# Patient Record
Sex: MALE | Race: OTHER | Employment: UNEMPLOYED | URBAN - METROPOLITAN AREA
[De-identification: names, ages, dates, MRNs, and addresses within clinical notes are randomized per-mention and may not be internally consistent; named-entity substitution may affect disease eponyms.]

---

## 2017-02-01 ENCOUNTER — HOSPITAL ENCOUNTER (OUTPATIENT)
Dept: LAB | Age: 42
Discharge: HOME | End: 2017-02-01

## 2017-02-01 LAB
ALBUMIN SERPL BCP-MCNC: 4.4 G/DL (ref 3.5–5)
ALBUMIN/GLOB SERPL: 1.4 {RATIO} (ref 1.1–2.2)
ALP SERPL-CCNC: 98 U/L (ref 45–117)
ALT SERPL-CCNC: 46 U/L (ref 12–78)
ANION GAP BLD CALC-SCNC: 8 MMOL/L (ref 5–15)
AST SERPL W P-5'-P-CCNC: 24 U/L (ref 15–37)
BILIRUB SERPL-MCNC: 0.4 MG/DL (ref 0.2–1)
BUN SERPL-MCNC: 15 MG/DL (ref 6–20)
BUN/CREAT SERPL: 16 (ref 12–20)
CALCIUM SERPL-MCNC: 8.8 MG/DL (ref 8.5–10.1)
CHLORIDE SERPL-SCNC: 102 MMOL/L (ref 97–108)
CHOLEST SERPL-MCNC: 171 MG/DL
CO2 SERPL-SCNC: 26 MMOL/L (ref 21–32)
CREAT SERPL-MCNC: 0.94 MG/DL (ref 0.7–1.3)
GLOBULIN SER CALC-MCNC: 3.1 G/DL (ref 2–4)
GLUCOSE SERPL-MCNC: 90 MG/DL (ref 65–100)
HDLC SERPL-MCNC: 27 MG/DL
HDLC SERPL: 6.3 {RATIO} (ref 0–5)
LDLC SERPL CALC-MCNC: ABNORMAL MG/DL (ref 0–100)
LDLC SERPL DIRECT ASSAY-MCNC: 85 MG/DL (ref 0–100)
LIPID PROFILE,FLP: ABNORMAL
POTASSIUM SERPL-SCNC: 3.9 MMOL/L (ref 3.5–5.1)
PROT SERPL-MCNC: 7.5 G/DL (ref 6.4–8.2)
SODIUM SERPL-SCNC: 136 MMOL/L (ref 136–145)
TRIGL SERPL-MCNC: 518 MG/DL (ref ?–150)
VLDLC SERPL CALC-MCNC: ABNORMAL MG/DL

## 2017-02-01 PROCEDURE — 83721 ASSAY OF BLOOD LIPOPROTEIN: CPT | Performed by: INTERNAL MEDICINE

## 2017-02-01 PROCEDURE — 80053 COMPREHEN METABOLIC PANEL: CPT | Performed by: INTERNAL MEDICINE

## 2017-02-01 PROCEDURE — 80061 LIPID PANEL: CPT | Performed by: INTERNAL MEDICINE

## 2017-07-10 ENCOUNTER — HOSPITAL ENCOUNTER (OUTPATIENT)
Dept: LAB | Age: 42
Discharge: HOME | End: 2017-07-10

## 2017-07-10 PROCEDURE — 84460 ALANINE AMINO (ALT) (SGPT): CPT | Performed by: INTERNAL MEDICINE

## 2017-07-10 PROCEDURE — 83721 ASSAY OF BLOOD LIPOPROTEIN: CPT | Performed by: INTERNAL MEDICINE

## 2017-07-10 PROCEDURE — 80061 LIPID PANEL: CPT | Performed by: INTERNAL MEDICINE

## 2017-07-11 LAB
ALT SERPL-CCNC: 53 U/L (ref 12–78)
CHOLEST SERPL-MCNC: 172 MG/DL
HDLC SERPL-MCNC: 27 MG/DL
HDLC SERPL: 6.4 {RATIO} (ref 0–5)
LDLC SERPL CALC-MCNC: ABNORMAL MG/DL (ref 0–100)
LDLC SERPL DIRECT ASSAY-MCNC: 79 MG/DL (ref 0–100)
LIPID PROFILE,FLP: ABNORMAL
TRIGL SERPL-MCNC: 511 MG/DL (ref ?–150)
VLDLC SERPL CALC-MCNC: ABNORMAL MG/DL

## 2017-08-21 ENCOUNTER — HOSPITAL ENCOUNTER (OUTPATIENT)
Dept: LAB | Age: 42
Discharge: HOME | End: 2017-08-21

## 2017-08-21 PROCEDURE — 80061 LIPID PANEL: CPT | Performed by: INTERNAL MEDICINE

## 2017-08-21 PROCEDURE — 84460 ALANINE AMINO (ALT) (SGPT): CPT | Performed by: INTERNAL MEDICINE

## 2017-08-22 LAB
ALT SERPL-CCNC: 53 U/L (ref 12–78)
CHOLEST SERPL-MCNC: 182 MG/DL
HDLC SERPL-MCNC: 37 MG/DL
HDLC SERPL: 4.9 {RATIO} (ref 0–5)
LDLC SERPL CALC-MCNC: 91.4 MG/DL (ref 0–100)
LIPID PROFILE,FLP: ABNORMAL
TRIGL SERPL-MCNC: 268 MG/DL (ref ?–150)
VLDLC SERPL CALC-MCNC: 53.6 MG/DL

## 2018-03-20 ENCOUNTER — HOSPITAL ENCOUNTER (OUTPATIENT)
Dept: NON INVASIVE DIAGNOSTICS | Age: 43
Discharge: HOME | End: 2018-03-20
Payer: SUBSIDIZED

## 2018-03-20 DIAGNOSIS — R94.31 ABNORMAL ELECTROCARDIOGRAM: ICD-10-CM

## 2018-03-20 PROCEDURE — 93306 TTE W/DOPPLER COMPLETE: CPT

## 2018-08-13 ENCOUNTER — HOSPITAL ENCOUNTER (OUTPATIENT)
Dept: LAB | Age: 43
Discharge: HOME | End: 2018-08-13

## 2018-08-13 PROCEDURE — 80061 LIPID PANEL: CPT | Performed by: NURSE PRACTITIONER

## 2018-08-14 LAB
CHOLEST SERPL-MCNC: 156 MG/DL
HDLC SERPL-MCNC: 28 MG/DL
HDLC SERPL: 5.6 {RATIO} (ref 0–5)
LDLC SERPL CALC-MCNC: 63 MG/DL (ref 0–100)
LIPID PROFILE,FLP: ABNORMAL
TRIGL SERPL-MCNC: 325 MG/DL (ref ?–150)
VLDLC SERPL CALC-MCNC: 65 MG/DL

## 2019-06-04 ENCOUNTER — HOSPITAL ENCOUNTER (OUTPATIENT)
Dept: LAB | Age: 44
Discharge: HOME | End: 2019-06-04

## 2019-06-04 LAB
ALBUMIN SERPL-MCNC: 4.4 G/DL (ref 3.5–5)
ALBUMIN/GLOB SERPL: 1.2 {RATIO} (ref 1.1–2.2)
ALP SERPL-CCNC: 99 U/L (ref 45–117)
ALT SERPL-CCNC: 60 U/L (ref 12–78)
ANION GAP SERPL CALC-SCNC: 8 MMOL/L (ref 5–15)
AST SERPL-CCNC: 39 U/L (ref 15–37)
BILIRUB SERPL-MCNC: 0.5 MG/DL (ref 0.2–1)
BUN SERPL-MCNC: 14 MG/DL (ref 6–20)
BUN/CREAT SERPL: 15 (ref 12–20)
CALCIUM SERPL-MCNC: 9.5 MG/DL (ref 8.5–10.1)
CHLORIDE SERPL-SCNC: 105 MMOL/L (ref 97–108)
CO2 SERPL-SCNC: 26 MMOL/L (ref 21–32)
CREAT SERPL-MCNC: 0.92 MG/DL (ref 0.7–1.3)
GLOBULIN SER CALC-MCNC: 3.7 G/DL (ref 2–4)
GLUCOSE SERPL-MCNC: 102 MG/DL (ref 65–100)
POTASSIUM SERPL-SCNC: 4.4 MMOL/L (ref 3.5–5.1)
PROT SERPL-MCNC: 8.1 G/DL (ref 6.4–8.2)
SODIUM SERPL-SCNC: 139 MMOL/L (ref 136–145)
TSH SERPL DL<=0.05 MIU/L-ACNC: 0.68 UIU/ML (ref 0.36–3.74)

## 2019-06-04 PROCEDURE — 84443 ASSAY THYROID STIM HORMONE: CPT

## 2019-06-04 PROCEDURE — 80053 COMPREHEN METABOLIC PANEL: CPT

## 2020-02-06 ENCOUNTER — OFFICE VISIT (OUTPATIENT)
Dept: FAMILY MEDICINE CLINIC | Age: 45
End: 2020-02-06

## 2020-02-06 ENCOUNTER — HOSPITAL ENCOUNTER (OUTPATIENT)
Dept: LAB | Age: 45
Discharge: HOME | End: 2020-02-06

## 2020-02-06 VITALS
TEMPERATURE: 98 F | WEIGHT: 201 LBS | HEIGHT: 65 IN | HEART RATE: 85 BPM | DIASTOLIC BLOOD PRESSURE: 80 MMHG | SYSTOLIC BLOOD PRESSURE: 147 MMHG | BODY MASS INDEX: 33.49 KG/M2

## 2020-02-06 DIAGNOSIS — R79.89 LOW TSH LEVEL: ICD-10-CM

## 2020-02-06 DIAGNOSIS — R79.89 LOW TSH LEVEL: Primary | ICD-10-CM

## 2020-02-06 DIAGNOSIS — R00.2 PALPITATION: ICD-10-CM

## 2020-02-06 DIAGNOSIS — I10 ESSENTIAL HYPERTENSION: ICD-10-CM

## 2020-02-06 LAB
ALBUMIN SERPL-MCNC: 3.9 G/DL (ref 3.5–5)
ALBUMIN/GLOB SERPL: 1.2 {RATIO} (ref 1.1–2.2)
ALP SERPL-CCNC: 149 U/L (ref 45–117)
ALT SERPL-CCNC: 70 U/L (ref 12–78)
ANION GAP SERPL CALC-SCNC: 4 MMOL/L (ref 5–15)
AST SERPL-CCNC: 35 U/L (ref 15–37)
BILIRUB SERPL-MCNC: 0.3 MG/DL (ref 0.2–1)
BUN SERPL-MCNC: 15 MG/DL (ref 6–20)
BUN/CREAT SERPL: 17 (ref 12–20)
CALCIUM SERPL-MCNC: 9.5 MG/DL (ref 8.5–10.1)
CHLORIDE SERPL-SCNC: 104 MMOL/L (ref 97–108)
CO2 SERPL-SCNC: 30 MMOL/L (ref 21–32)
CREAT SERPL-MCNC: 0.86 MG/DL (ref 0.7–1.3)
GLOBULIN SER CALC-MCNC: 3.3 G/DL (ref 2–4)
GLUCOSE SERPL-MCNC: 98 MG/DL (ref 65–100)
POTASSIUM SERPL-SCNC: 4.4 MMOL/L (ref 3.5–5.1)
PROT SERPL-MCNC: 7.2 G/DL (ref 6.4–8.2)
SODIUM SERPL-SCNC: 138 MMOL/L (ref 136–145)
T4 FREE SERPL-MCNC: 3.6 NG/DL (ref 0.8–1.5)
TSH SERPL DL<=0.05 MIU/L-ACNC: <0.01 UIU/ML (ref 0.36–3.74)

## 2020-02-06 PROCEDURE — 84480 ASSAY TRIIODOTHYRONINE (T3): CPT

## 2020-02-06 PROCEDURE — 84443 ASSAY THYROID STIM HORMONE: CPT

## 2020-02-06 PROCEDURE — 86376 MICROSOMAL ANTIBODY EACH: CPT

## 2020-02-06 PROCEDURE — 80053 COMPREHEN METABOLIC PANEL: CPT

## 2020-02-06 PROCEDURE — 84439 ASSAY OF FREE THYROXINE: CPT

## 2020-02-06 RX ORDER — METOPROLOL SUCCINATE 100 MG/1
100 TABLET, EXTENDED RELEASE ORAL DAILY
Qty: 30 TAB | Refills: 5 | Status: SHIPPED | OUTPATIENT
Start: 2020-02-06 | End: 2020-03-25 | Stop reason: SDUPTHER

## 2020-02-06 NOTE — PROGRESS NOTES
HISTORY OF PRESENT ILLNESS  Anh Diaz is a 40 y.o. male. HPI  Patient [facility] He had some labs and the TSH was low. Blood pressure was 160/94. He had 1 month history of dizziness, fatigue, malaise, desperate. Then tearful. This is why [facility] and had labs and was started on medication for blood pressure. He was started on metoprolol ER 50 mg. States he is feeling better. Review of Systems   Constitutional: Positive for malaise/fatigue. HENT: Negative for ear discharge, ear pain, hearing loss, nosebleeds and tinnitus. Eyes: Negative for blurred vision, double vision, photophobia and pain. Respiratory: Negative for cough, hemoptysis and sputum production. Cardiovascular: Positive for palpitations. Gastrointestinal: Negative for abdominal pain, diarrhea, heartburn, nausea and vomiting. Genitourinary: Negative for dysuria, frequency, hematuria and urgency. Musculoskeletal: Negative for back pain, myalgias and neck pain. Skin: Negative for itching and rash. Neurological: Negative for dizziness, tingling, sensory change and headaches. /80 (BP 1 Location: Left arm, BP Patient Position: Sitting)   Pulse 85   Temp 98 °F (36.7 °C) (Oral)   Ht 5' 4.88\" (1.648 m)   Wt 201 lb (91.2 kg)   BMI 33.57 kg/m²   Physical Exam  Constitutional:       General: He is not in acute distress. Appearance: He is not ill-appearing or toxic-appearing. HENT:      Head: Normocephalic. Right Ear: Tympanic membrane normal.      Left Ear: Tympanic membrane normal.      Nose: Nose normal. No congestion. Mouth/Throat:      Mouth: Mucous membranes are moist.      Pharynx: No oropharyngeal exudate or posterior oropharyngeal erythema. Neck:      Musculoskeletal: Normal range of motion. No neck rigidity or muscular tenderness. Cardiovascular:      Rate and Rhythm: Normal rate. Pulses: Normal pulses. Heart sounds: Normal heart sounds. No murmur. Pulmonary:      Effort: Pulmonary effort is normal. No respiratory distress. Breath sounds: No stridor. No wheezing. Musculoskeletal: Normal range of motion. General: No swelling or tenderness. Skin:     General: Skin is warm. Coloration: Skin is not jaundiced. Findings: No bruising. Neurological:      Mental Status: He is alert. ASSESSMENT and PLAN  Diagnoses and all orders for this visit:    1. Low TSH level  -     METABOLIC PANEL, COMPREHENSIVE; Future  -     T4, FREE; Future  -     TSH 3RD GENERATION; Future  -     THYROID ANTIBODY PANEL; Future  -     T3 TOTAL; Future  -     REFERRAL TO ENDOCRINOLOGY  -     US THYROID/PARATHYROID/SOFT TISS; Future    2. Palpitation    3. Essential hypertension  -     metoprolol succinate (TOPROL-XL) 100 mg tablet; Take 1 Tab by mouth daily.       Patient's TSH was low, he has had palpitation and nervousness, symptoms improved with beta blocker  We will check labs today order ultrasound and refer to endocrinology  Increase metoprolol today

## 2020-02-06 NOTE — PROGRESS NOTES
Coordination of Care  1. Have you been to the ER, urgent care clinic since your last visit? Hospitalized since your last visit? Yes [facility], [facility], thyroid problem    2. Have you seen or consulted any other health care providers outside of the [address] [place] [address] since your last visit? Include any pap smears or colon screening. No    Does the patient need refills? YES    Learning Assessment Complete?  yes  Depression Screening complete in the past 12 months? yes

## 2020-02-06 NOTE — PROGRESS NOTES
Check-out Note: Refer to Dr. Carley Olmos   Ultrasound of thyroid   Good rx       Reviewed AVS, prescription and pharmacy location with patient. AVS printed and given along with goodrx coupon card. Patient aware that provider would like to follow up about today's visit in 2 weeks with an appt. Ok to [employer] and patient also referred to see Dr. Carley Olmos for [facility]. US was scheduled for patient, he agreed with date, time, and place of appt. Patient aware to arrive at 10:30am to outpatient registration with a photo ID, care card process explained and application given. This has been fully explained to the patient, who indicates understanding and agrees with plan. No further questions at this time.  Marlee Carl RN

## 2020-02-08 ENCOUNTER — HOSPITAL ENCOUNTER (OUTPATIENT)
Dept: ULTRASOUND IMAGING | Age: 45
Discharge: HOME | End: 2020-02-08
Attending: FAMILY MEDICINE
Payer: SUBSIDIZED

## 2020-02-08 DIAGNOSIS — R79.89 LOW TSH LEVEL: ICD-10-CM

## 2020-02-08 LAB
T3 SERPL-MCNC: 338 NG/DL (ref 71–180)
THYROGLOB AB SERPL-ACNC: <1 IU/ML (ref 0–0.9)
THYROPEROXIDASE AB SERPL-ACNC: 8 IU/ML (ref 0–34)

## 2020-02-08 PROCEDURE — 76536 US EXAM OF HEAD AND NECK: CPT

## 2020-02-10 NOTE — PROGRESS NOTES
Labs are back and showed the thyroid is still working at a fast rate. We need to continue medications that were prescribed during last visit. He should have appointment with me in 2 weeks were we will discuss next step in management.   Please make sure patient will come to the appointment  Ultrasound results are also back, no tumors seen, we will discuss during the visit

## 2020-02-18 NOTE — PROGRESS NOTES
Tc to the pt. Int # [id number]. The pt's lab results message from the provider was given to the pt. His appt this Thurs 02/20/20 was reviewed with him and he was told it was important for him to keep his appt. The pt verbalized understanding.  Kayleigh Mendoza RN

## 2020-02-20 ENCOUNTER — OFFICE VISIT (OUTPATIENT)
Dept: FAMILY MEDICINE CLINIC | Age: 45
End: 2020-02-20

## 2020-02-20 VITALS
TEMPERATURE: 98.2 F | BODY MASS INDEX: 33.39 KG/M2 | DIASTOLIC BLOOD PRESSURE: 92 MMHG | SYSTOLIC BLOOD PRESSURE: 151 MMHG | OXYGEN SATURATION: 98 % | HEIGHT: 65 IN | HEART RATE: 113 BPM | WEIGHT: 200.4 LBS

## 2020-02-20 DIAGNOSIS — E05.90 HYPERTHYROIDISM: Primary | ICD-10-CM

## 2020-02-20 RX ORDER — METHIMAZOLE 5 MG/1
5 TABLET ORAL 2 TIMES DAILY
Qty: 60 TAB | Refills: 3 | Status: SHIPPED | OUTPATIENT
Start: 2020-02-20 | End: 2020-03-04

## 2020-02-20 NOTE — PROGRESS NOTES
Coordination of Care  1. Have you been to the ER, urgent care clinic since your last visit? Hospitalized since your last visit? No    2. Have you seen or consulted any other health care providers outside of the [address] [place] [address] since your last visit? Include any pap smears or colon screening. No    Does the patient need refills? YES    Learning Assessment Complete?  yes  Depression Screening complete in the past 12 months? yes

## 2020-02-20 NOTE — PROGRESS NOTES
Printed AVS, provided to patient and reviewed. Ordered medication reviewed with pt and coupon given. Pt sent to registration to make a 4 week FU appointment. Staff message sent to Maximus Aguilar for appointment with Dionicio Valencia as soon as possible. All questions answered and pt verbalized understanding. Lovely Renee interpreted for this encounter.  Nik Alas RN

## 2020-02-20 NOTE — PROGRESS NOTES
HISTORY OF PRESENT ILLNESS  Zohaib Lowe is a 40 y.o. male. HPI  Patient is here to follow up on ultrasound and labs. He is still having palpitations. Has been taking metoprolol which we increased to 100 mg a day. No other concerns  Review of Systems   Constitutional: Positive for malaise/fatigue. HENT: Negative for ear discharge, ear pain, hearing loss, nosebleeds and tinnitus. Eyes: Negative for blurred vision, double vision, photophobia and pain. Respiratory: Negative for cough, hemoptysis and sputum production. Cardiovascular: Positive for palpitations. Gastrointestinal: Negative for abdominal pain, diarrhea, heartburn, nausea and vomiting. Genitourinary: Negative for dysuria, frequency, hematuria and urgency. Musculoskeletal: Negative for back pain, myalgias and neck pain. Skin: Negative for itching and rash. Neurological: Negative for dizziness, tingling, sensory change and headaches. BP (!) 151/92 (BP 1 Location: Right arm, BP Patient Position: Sitting)   Pulse (!) 113   Temp 98.2 °F (36.8 °C) (Oral)   Ht 5' 4.88\" (1.648 m)   Wt 200 lb 6.4 oz (90.9 kg)   SpO2 98%   BMI 33.47 kg/m²   Physical Exam  Constitutional:       General: He is not in acute distress. Appearance: He is not ill-appearing or toxic-appearing. HENT:      Head: Normocephalic. Right Ear: Tympanic membrane normal.      Left Ear: Tympanic membrane normal.      Nose: Nose normal. No congestion. Mouth/Throat:      Mouth: Mucous membranes are moist.      Pharynx: No oropharyngeal exudate or posterior oropharyngeal erythema. Neck:      Musculoskeletal: Normal range of motion. No neck rigidity or muscular tenderness. Cardiovascular:      Rate and Rhythm: Normal rate. Pulses: Normal pulses. Heart sounds: Normal heart sounds. No murmur. Pulmonary:      Effort: Pulmonary effort is normal. No respiratory distress. Breath sounds: No stridor. No wheezing.    Musculoskeletal: Normal range of motion. General: No swelling or tenderness. Skin:     General: Skin is warm. Coloration: Skin is not jaundiced. Findings: No bruising. Neurological:      Mental Status: He is alert. ASSESSMENT and PLAN  Diagnoses and all orders for this visit:    1. Hyperthyroidism  -     methIMAzole (TAPAZOLE) 5 mg tablet; Take 1 Tab by mouth two (2) times a day.     we will start methimazole start 5 mg, may need to go up to 10 mg  Follow up in 4 weeks

## 2020-02-28 ENCOUNTER — TELEPHONE (OUTPATIENT)
Dept: FAMILY MEDICINE CLINIC | Age: 45
End: 2020-02-28

## 2020-02-28 NOTE — TELEPHONE ENCOUNTER
Dr. Sylvester Vergara made referral to have this pt seen by Dr. Mulu Lopez. Pt contacted with [employer]  # [id number] and scheduled for first available appointment for 4/1/20 at 2:40 at [facility]. Pt took down address and appointment information.  Yanira Barnard RN

## 2020-03-04 ENCOUNTER — OFFICE VISIT (OUTPATIENT)
Dept: FAMILY MEDICINE CLINIC | Age: 45
End: 2020-03-04

## 2020-03-04 VITALS
HEIGHT: 65 IN | OXYGEN SATURATION: 96 % | SYSTOLIC BLOOD PRESSURE: 152 MMHG | BODY MASS INDEX: 33.62 KG/M2 | TEMPERATURE: 98.6 F | HEART RATE: 86 BPM | DIASTOLIC BLOOD PRESSURE: 90 MMHG | WEIGHT: 201.8 LBS

## 2020-03-04 DIAGNOSIS — E05.90 HYPERTHYROIDISM: Primary | ICD-10-CM

## 2020-03-04 RX ORDER — METHIMAZOLE 5 MG/1
5 TABLET ORAL DAILY
Qty: 60 TAB | Refills: 3
Start: 2020-03-04 | End: 2020-03-04

## 2020-03-04 RX ORDER — METHIMAZOLE 5 MG/1
10 TABLET ORAL DAILY
Qty: 60 TAB | Refills: 3
Start: 2020-03-04 | End: 2020-03-25 | Stop reason: SDUPTHER

## 2020-03-04 NOTE — PROGRESS NOTES
Chief Complaint   Patient presents with    New Patient     thyroid     History of Present Illness: Toney Yanez is a 40 y.o. male who is a new patient for thyroid. Went to [facility] on 1/26/20 because he was feeling more depressed and was crying more and was having dizziness and weight loss of 18 lb for the past 2 months. Was having some palpitations but no chest pain. Did have some tremors. Also was having heat intolerance and some itching at night. Bowels are regular. Has had more thirst.  Does have some trouble sleeping but this is because he is feeling hotter. Has had some pain in his legs. Not too much fatigue. No anterior neck pain or swelling. No FH of thyroid disease. His TSH was <0.006 at [facility] and was given Toprol XL 50 mg daily and then saw Dr. Gabriela Geiger on 2/6/20 and his TSH was <0.01, FT4 was high at 3.6 and T3 was high at 338 and TPO ab was normal at 8 and TG ab was <1.0. He had a thyroid ultrasound that showed enlarged heterogeneous hypervascular thyroid gland without discrete nodule. Prominent bilateral cervical lymph nodes. His Toprol XL was increased to 100 mg daily. He returned to see Dr. Gabriela Geiger on 2/20/20 to review his lab results and was prescribed methimazole 5 mg bid but was told to start with just 1 tab daily and see how he felt so he has just been taking this dose. He is starting to feel better with taking this. Past Medical History:   Diagnosis Date    Elbow fracture, right     as child    GERD (gastroesophageal reflux disease)     Hypertension     given rx for medication but not filled currently    Hyperthyroidism 3/4/2020     Past Surgical History:   Procedure Laterality Date    HX ORTHOPAEDIC Right     elbow fracture     Current Outpatient Medications   Medication Sig    methIMAzole (TAPAZOLE) 5 mg tablet Take 1 Tab by mouth daily.  Dose change 3/4/20--updated med list--did not send prescription to the pharmacy    metoprolol succinate (TOPROL-XL) 100 mg tablet Take 1 Tab by mouth daily. No current facility-administered medications for this visit. Allergies   Allergen Reactions    Nabumetone Itching     Family History   Problem Relation Age of Onset    Alcohol abuse Father     Diabetes Maternal Aunt     Thyroid Disease Neg Hx      Social History     Socioeconomic History    Marital status:      Spouse name: Not on file    Number of children: Not on file    Years of education: Not on file    Highest education level: Not on file   Occupational History    Not on file   Social Needs    Financial resource strain: Not on file    Food insecurity:     Worry: Not on file     Inability: Not on file    Transportation needs:     Medical: Not on file     Non-medical: Not on file   Tobacco Use    Smoking status: Never Smoker    Smokeless tobacco: Never Used   Substance and Sexual Activity    Alcohol use: Yes     Alcohol/week: 0.0 standard drinks     Comment: 1-2 beers once a month    Drug use: Never    Sexual activity: Not on file   Lifestyle    Physical activity:     Days per week: Not on file     Minutes per session: Not on file    Stress: Not on file   Relationships    Social connections:     Talks on phone: Not on file     Gets together: Not on file     Attends [employer] service: Not on file     Active member of club or organization: Not on file     Attends meetings of clubs or organizations: Not on file     Relationship status: Not on file    Intimate partner violence:     Fear of current or ex partner: Not on file     Emotionally abused: Not on file     Physically abused: Not on file     Forced sexual activity: Not on file   Other Topics Concern    Not on file   Social History Narrative    Lives in [facility] with his wife and 15 yo son. Also has a 20 yo son. Originally from [place]. Has lived in the [address] [place], [place] [address] for 28 years. Not currently working.      Review of Systems:  - Constitutional Symptoms: no fevers, chills, (+) weight loss as above  - Eyes: occ blurry vision, no double vision  - Cardiovascular: no chest pain or palpitations  - Respiratory: no cough or shortness of breath  - Gastrointestinal: no dysphagia or abdominal pain  - Musculoskeletal: (+) leg pains  - Integumentary: (+) itching at night  - Neurological: some mild headaches  - Psychiatric: some depression and anxiety  - Endocrine: (+) heat intolerance, some polyuria and polydipsia    Physical Examination:  Blood pressure 152/90, pulse 86, temperature 98.6 °F (37 °C), temperature source Oral, height 5' 4.88\" (1.648 m), weight 201 lb 12.8 oz (91.5 kg), SpO2 96 %.   - General: pleasant, no distress, good eye contact  - HEENT: no exopthalmos, no periorbital edema, no scleral/conjunctival injection, EOMI, no lid lag or stare  - Neck: supple, (+) goiter 1.5x normal without thyroid bruit, no masses, lymph nodes, or carotid bruits, no supraclavicular or dorsocervical fat pads  - Cardiovascular: regular, normal rate, normal S1 and S2, no murmurs/rubs/gallops   - Respiratory: clear to auscultation bilaterally  - Gastrointestinal: soft, nontender, nondistended, no masses, no hepatosplenomegaly  - Musculoskeletal: no proximal muscle weakness in upper or lower extremities  - Integumentary: no acanthosis nigricans, no abdominal striae, no rashes, no edema  - Neurological: reflexes 2+ at biceps, very mild tremor  - Psychiatric: normal mood and affect    Data Reviewed:   Component      Latest Ref Rng & Units 2/6/2020 2/6/2020 2/6/2020 2/6/2020           2:30 PM  2:30 PM  2:30 PM  2:30 PM   Thyroid peroxidase Ab      0 - 34 IU/mL 8      Thyroglobulin Ab      0.0 - 0.9 IU/mL <1.0      T4, Free      0.8 - 1.5 NG/DL   3.6 (H)    TSH      0.36 - 3.74 uIU/mL    <0.01 (L)   T3, total      71 - 180 ng/dL  338 (H)       - thyroid ultrasound report:  EXAM: US THYROID/PARATHYROID/SOFT TISS      INDICATION: Low TSH levels, throat pain.     COMPARISON: None.     TECHNIQUE: Real-time sonography of the thyroid gland was performed with a high  frequency linear transducer. Multiple static images were obtained.     FINDINGS:  The thyroid is heterogeneous and hypervascular in appearance with no mass,  nodule or other abnormality. Prominent bilateral cervical lymph nodes are noted,  the one on the right measuring 11 mm and the one on the left measuring 9 mm.     The right lobe measures 5.8 x 1.9 x 1.6 cm and the left lobe measures 4.6 x 1.5  x 1.8 cm. The isthmus measures 6 mm.     IMPRESSION  IMPRESSION: Enlarged heterogeneous hypervascular thyroid gland without discrete  nodule. Prominent bilateral cervical lymph nodes. Assessment/Plan:   1. Hyperthyroidism: Went to [facility] on 1/26/20 and TSH was <0.006 and was given Toprol XL 50 mg daily and then saw Dr. Arik Koo on 2/6/20 and his TSH was <0.01, FT4 was high at 3.6 and T3 was high at 338 and TPO ab was normal at 8 and TG ab was <1.0. He had a thyroid ultrasound that showed enlarged heterogeneous hypervascular thyroid gland without discrete nodule. Prominent bilateral cervical lymph nodes. His Toprol XL was increased to 100 mg daily. He returned to see Dr. Arik Koo on 2/20/20 to review his lab results and was prescribed methimazole 5 mg bid but was told to start with just 1 tab daily and see how he felt so he has just been taking this dose. Given his FT4 and T3 levels are about 2x normal, I think he would benefit from taking 10 mg of methimazole daily to more quickly normalize his levels so asked him to go up to this dose until he comes back to see me in 6 weeks. Will keep him on Toprol XL for now but hopefully can taper off this in the future once his thyroid is better controlled. Will screen for Grave's with a TSH receptor ab with next set of labs as this is the likely diagnosis.   - increase methimazole to 10 mg daily  - cont Toprol  mg daily  - check TSH, free T4, and total T3 and TSH receptor ab prior to next visit       Patient Instructions   1) Usted tiene hypertiroidismo y robertson nivel de T4 esta alto (3.6--normal es 1.8) y T3 est alto (338--normal es 180). 2) Carlee 2 pastillas de methimazole juntas en la manana. 3) Vamos a trabjar para regular esta condicion por 1-2 anos. 4) Me verás la próxima vez en [address] [place] [address], [address], [place], [address] [place] [address]. Planee ir a monica de los sitios de la [facility] en las 1-2 semanas antes de robertson visita para que le vivek un análisis de laboratorio y los revisaremos en la próxima william.         Follow-up and Dispositions    · Return for 4/15/20 at 2:20pm.             Copy sent to:  Nadya Henao MD

## 2020-03-04 NOTE — PATIENT INSTRUCTIONS
1) Usted tiene hypertiroidismo y robertson nivel de T4 esta alto (3.6--normal es 1.8) y T3 est alto (338--normal es 180). 2) Carlee 2 pastillas de methimazole juntas en la manana. 3) Vamos a trabjar para regular esta condicion por 1-2 anos. 4) Me verás la próxima vez en [address], [address], [place], [address] [place] [address]. Planee ir a monica de los sitios de la [facility] en las 1-2 semanas antes de robertson visita para que le vivek un análisis de laboratorio y los revisaremos en la próxima william.

## 2020-03-05 ENCOUNTER — TELEPHONE (OUTPATIENT)
Dept: FAMILY MEDICINE CLINIC | Age: 45
End: 2020-03-05

## 2020-03-16 NOTE — TELEPHONE ENCOUNTER
The pt's appt was canceled. Tc to the pt's mobile phone. No answer. No opportunity to leave a message. The pt's home # was called. His spouse answered. The pt was not home. The spouse is on the PHI. Her name is Isaak Ayers. The message was given to the spouse that the appt on 03/25/20, with Dr Wilman Choudhary was cancelled. She was told the reason for cancelling it was it was for f/u for Thyroid and Dr Rochelle Bourgeois was already seeing him for his Thyroid.  Hardeep Perez RN

## 2020-03-25 ENCOUNTER — OFFICE VISIT (OUTPATIENT)
Dept: FAMILY MEDICINE CLINIC | Age: 45
End: 2020-03-25

## 2020-03-25 DIAGNOSIS — I10 ESSENTIAL HYPERTENSION: ICD-10-CM

## 2020-03-25 DIAGNOSIS — E05.90 HYPERTHYROIDISM: Primary | ICD-10-CM

## 2020-03-25 RX ORDER — METOPROLOL SUCCINATE 100 MG/1
100 TABLET, EXTENDED RELEASE ORAL DAILY
Qty: 90 TAB | Refills: 3 | Status: SHIPPED | OUTPATIENT
Start: 2020-03-25 | End: 2020-04-15 | Stop reason: DRUGHIGH

## 2020-03-25 RX ORDER — METHIMAZOLE 5 MG/1
10 TABLET ORAL DAILY
Qty: 180 TAB | Refills: 3 | Status: SHIPPED | OUTPATIENT
Start: 2020-03-25 | End: 2020-04-15 | Stop reason: DRUGHIGH

## 2020-03-25 NOTE — PROGRESS NOTES
HISTORY OF PRESENT ILLNESS  Toney Yanez is a 40 y.o. male. HPI   Patient agree to telemedicine visit. Communication via telephone  Patient states he is feeling better with the medication. He states he does not have any symptoms, sometimes he may feel in the evening a little chest pressure but is rare. He has not had a chance to check his blood pressure. ROS    Physical Exam    ASSESSMENT and PLAN  Diagnoses and all orders for this visit:    1. Hyperthyroidism  -     methIMAzole (TAPAZOLE) 5 mg tablet; Take 2 Tabs by mouth daily. 2. Essential hypertension  -     metoprolol succinate (TOPROL-XL) 100 mg tablet; Take 1 Tab by mouth daily.       Patient is doing well we will change his prescription to 90 days supply  Follow up in mid may

## 2020-03-25 NOTE — PROGRESS NOTES
Coordination of Care  1. Have you been to the ER, urgent care clinic since your last visit? Hospitalized since your last visit? No    2. Have you seen or consulted any other health care providers outside of the [address] [place] [address] since your last visit? Include any pap smears or colon screening. No    Does the patient need refills? YES    Learning Assessment Complete? yes     Reviewed AVS with pt today over the telephone. Reviewed all medications and gave Good RX coupons when applicable through texting coupon to pt's phone with pt's permission. Reviewed plan of care with pt. For this phone call I used an  : not needed as pt speaks [place]. I sent a message to call back reg to please schedule pt for next appointment in [facility] May.  Martha Maria RN

## 2020-04-03 ENCOUNTER — HOSPITAL ENCOUNTER (OUTPATIENT)
Dept: LAB | Age: 45
Discharge: HOME | End: 2020-04-03

## 2020-04-03 ENCOUNTER — LAB ONLY (OUTPATIENT)
Dept: FAMILY MEDICINE CLINIC | Age: 45
End: 2020-04-03

## 2020-04-03 DIAGNOSIS — E05.90 HYPERTHYROIDISM: ICD-10-CM

## 2020-04-03 PROCEDURE — 84443 ASSAY THYROID STIM HORMONE: CPT

## 2020-04-03 PROCEDURE — 84439 ASSAY OF FREE THYROXINE: CPT

## 2020-04-03 PROCEDURE — 83520 IMMUNOASSAY QUANT NOS NONAB: CPT

## 2020-04-03 PROCEDURE — 84481 FREE ASSAY (FT-3): CPT

## 2020-04-03 NOTE — PROGRESS NOTES
Pt was here for labs only. T3, T4, TSH 3rd gen, TSH receptor AB drawn on LA w/no complications.     Jennifer Rogel

## 2020-04-04 LAB
T3FREE SERPL-MCNC: 13.5 PG/ML (ref 2.2–4)
T4 FREE SERPL-MCNC: 3.5 NG/DL (ref 0.8–1.5)
TSH SERPL DL<=0.05 MIU/L-ACNC: <0.01 UIU/ML (ref 0.36–3.74)

## 2020-04-05 LAB — TSH RECEP AB SER-ACNC: 29.7 IU/L (ref 0–1.75)

## 2020-04-15 ENCOUNTER — OFFICE VISIT (OUTPATIENT)
Dept: FAMILY MEDICINE CLINIC | Age: 45
End: 2020-04-15

## 2020-04-15 DIAGNOSIS — E05.90 HYPERTHYROIDISM: Primary | ICD-10-CM

## 2020-04-15 RX ORDER — METOPROLOL SUCCINATE 50 MG/1
50 TABLET, EXTENDED RELEASE ORAL DAILY
Qty: 90 TAB | Refills: 3 | Status: SHIPPED | OUTPATIENT
Start: 2020-04-15 | End: 2020-12-02

## 2020-04-15 RX ORDER — METHIMAZOLE 10 MG/1
15 TABLET ORAL DAILY
Qty: 135 TAB | Refills: 3 | Status: SHIPPED | OUTPATIENT
Start: 2020-04-15 | End: 2020-06-18

## 2020-04-15 NOTE — PROGRESS NOTES
Chief Complaint   Patient presents with    Thyroid Problem       **THIS IS A VIRTUAL VISIT VIA A TELEPHONE ENCOUNTER. PATIENT AGREED TO HAVE THEIR CARE DELIVERED OVER THE PHONE IN PLACE OF THEIR REGULARLY SCHEDULED OFFICE VISIT**    History of Present Illness: Lexus Barcenas is a 40 y.o. male here for follow up of thyroid. Taking methimazole 2 tabs daily in the morning. No chest pain but does have some shortness of breath. No palpitations. No tremors. Still does feel hot at times. Bowels are regular. Energy is better. Still has some itching at night but this is better with taking the higher dose of methimazole. Compliant with toprol XL. Current Outpatient Medications   Medication Sig    methIMAzole (TAPAZOLE) 5 mg tablet Take 2 Tabs by mouth daily.  metoprolol succinate (TOPROL-XL) 100 mg tablet Take 1 Tab by mouth daily. No current facility-administered medications for this visit. Allergies   Allergen Reactions    Nabumetone Itching     Review of Systems: PER HPI    Physical Examination: NOT PERFORMED DUE TO THIS BEING A TELEPHONE CALL      Data Reviewed:   Component      Latest Ref Rng & Units 4/3/2020 4/3/2020 4/3/2020 4/3/2020           9:30 AM  9:30 AM  9:30 AM  9:30 AM   Free Triiodothyronine (T3)      2.2 - 4.0 pg/mL   13.5 (H)    T4, Free      0.8 - 1.5 NG/DL  3.5 (H)     TSH      0.36 - 3.74 uIU/mL    <0.01 (L)   Thyrotropin Receptor Ab, serum      0.00 - 1.75 IU/L 29.70 (H)          Assessment/Plan:     1. Hyperthyroidism: [facility] on [facility]20 and TSH was <0.006 and was given Toprol XL 50 mg daily and then saw Dr. Cori Gregg on 2/6/20 and his TSH was <0.01, FT4 was high at 3.6 and T3 was high at 338 and TPO ab was normal at 8 and TG ab was <1.0. He had a thyroid ultrasound that showed enlarged heterogeneous hypervascular thyroid gland without discrete nodule. Prominent bilateral cervical lymph nodes. His Toprol XL was increased to 100 mg daily.   He returned to see Dr. Peewee Schwartz on 2/20/20 to review his lab results and was prescribed methimazole 5 mg bid but was told to start with just 1 tab daily and see how he felt so he has just been taking this dose. Given his FT4 and T3 levels are about 2x normal, I think he would benefit from taking 10 mg of methimazole daily to more quickly normalize his levels so asked him to go up to this dose until he comes back to see me in 6 weeks. Will keep him on Toprol XL for now but hopefully can taper off this in the future once his thyroid is better controlled. Repeat labs showed TSH < 0.01, FT4 3.5, FT3 13.5 and TRAb 29.7 in 4/20 confirming he has Grave's disease. Increased his dose to 15 mg for the next 2 months.  - increase methimazole to 15 mg daily  - decrease Toprol XL to 50 mg daily  - check TSH, free T4, and total T3 in 2 months  - check TSH, free T4, and total T3 and TSH receptor ab prior to next visit       Patient Instructions   1) Increase your methimazole to 15 mg daily. I sent 10 mg tablets to take one and a half tablets everyday to [address] [place] [address]. Use up the 5 mg tablets taking 3 tabs daily. 2) Decrease the metoprolol to 50 mg daily. I sent this to [address] [place] [address]. Use up the 100 mg tablets taking one half tablet daily. 3) The clinic will call you to make an appointment to have your labs drawn in 2 months in June and again 1-2 weeks prior to your visit in September. 4) Please come for a follow up visit on 9/16/20 at 2:20pm at [facility]. We spent 22 minutes of total time together during this virtual visit with a telephone encounter. All questions were answered and a copy of the instructions were sent to him via [employer] after translating to Peruvian using Google translate.     Copy sent to:  Rosio Castro MD    Lab follow up: 6/18/20  Component      Latest Ref Rng & Units 6/16/2020 6/16/2020 6/16/2020          10:52 AM 10:52 AM 10:52 AM   T4, Free      0.8 - 1.5 NG/DL   2.0 (H)   TSH 0.36 - 3.74 uIU/mL  <0.01 (L)    T3, total      71 - 180 ng/dL 248 (H)       Please call him to let him know his thyroid levels are improving but are still too high so the 15 mg dose of methimazole is working better but still not quite enough. Please have him increase his dose to 20 mg daily (2 of the 10 mg tabs together in the morning) until he comes back in September and he will need another lab appointment prior to that visit. I updated his prescription at [facility] for this higher dose.

## 2020-04-15 NOTE — PATIENT INSTRUCTIONS
1) Increase your methimazole to 15 mg daily. I sent 10 mg tablets to take one and a half tablets everyday to [address] [place] [address]. Use up the 5 mg tablets taking 3 tabs daily. 2) Decrease the metoprolol to 50 mg daily. I sent this to [address] [place] [address]. Use up the 100 mg tablets taking one half tablet daily. 3) The clinic will call you to make an appointment to have your labs drawn in 2 months in June and again 1-2 weeks prior to your visit in September. 4) Please come for a follow up visit on 9/16/20 at 2:20pm at [facility].

## 2020-04-15 NOTE — LETTER
4/29/2020 5:24 PM 
 
Mr. Amanda Rucker [address] [place] [address] 
 
1) Aumente robertson metimazol a 15 mg al día. Envié tabletas de 10 mg para rani charlotte tableta y media todos los días a [facility]. Use las tabletas de 5 mg tomando 3 pestañas al día. 2) Disminuya el metoprolol a 50 mg diarios. Envié esto a [facility]. Use las tabletas de 100 mg tomando media tableta al día. 3) La clínica lo llamará para programar charlotte william para que le saquen los laboratorios en 2 meses en junio y nuevamente 1-2 semanas antes de robertson visita en septiembre. 4) Por favor venga a charlotte visita de seguimiento el 16th de Septiembre a las 2:20 pm en [address].   
 
 
 
 
 
Sincerely, 
 
 
Ishmael Lopez MD 
 

verbal instruction

## 2020-04-15 NOTE — PROGRESS NOTES
Coordination of Care  1. Have you been to the ER, urgent care clinic since your last visit? Hospitalized since your last visit? No    2. Have you seen or consulted any other health care providers outside of the [address] [place] [address] since your last visit? Include any pap smears or colon screening. No    Does the patient need refills? YES    Learning Assessment Complete? yes  Depression Screening complete in the past 12 months? yes       I called pt today with Marina Awan as  to prepare them for telephonic appointment with Dr. Rody Al. Medication reconciliation done today. I have reviewed with pt that Dr. Rody Al will be calling them shortly and to be waiting for his phone call. I have explained to pt that we will call them to schedule next labs prior to their next appointment.  Kerry Oppenheim, RN

## 2020-05-20 ENCOUNTER — TELEPHONE (OUTPATIENT)
Dept: FAMILY MEDICINE CLINIC | Age: 45
End: 2020-05-20

## 2020-06-01 ENCOUNTER — OFFICE VISIT (OUTPATIENT)
Dept: FAMILY MEDICINE CLINIC | Age: 45
End: 2020-06-01

## 2020-06-01 DIAGNOSIS — H73.91 TYMPANIC MEMBRANE IRRITATION, RIGHT: ICD-10-CM

## 2020-06-01 DIAGNOSIS — I10 ESSENTIAL HYPERTENSION: ICD-10-CM

## 2020-06-01 DIAGNOSIS — E05.90 HYPERTHYROIDISM: Primary | ICD-10-CM

## 2020-06-01 NOTE — PROGRESS NOTES
HISTORY OF PRESENT ILLNESS  Kirill Xavier is a 40 y.o. male. HPI  Patient agrees to telemedicine. Communication done via phone. He is feeling well. States the metoprolol was decreased to 50 mg and there has been no chest pain and no shortness of breath. He is taking 15 mg of methimazole once a day. [facility]. The blood pressure was a little high that day. He has had problems with the ear and chronic ear infections for years. He felt pain and couldn't here from the ear. He was told the Tympanic membrane didn't look well. The ear giving him problems is the right ear. He was prescribed a nasal spray but is not helping too much. ROS    Physical Exam    ASSESSMENT and PLAN  Diagnoses and all orders for this visit:    1. Hyperthyroidism    2. Essential hypertension    3. Tympanic membrane irritation, right        Continue 15 mg methimazole. Patient is scheduled to come in for labs on 6/16/20. Blood pressure remains elevated,  He denies any chest pain, no shortness of breath, no headaches. I have advise patient get a monitor for blood pressure and keep a log. We may need to add blood pressure medication depending on readings. Patient had a Urgent care visit due to right ear problem and was told the right TM didn't look well. He was prescribed nasal steroids. We will need to evaluate in person to determine best course of action.

## 2020-06-01 NOTE — PROGRESS NOTES
Coordination of Care  1. Have you been to the ER, urgent care clinic since your last visit? Hospitalized since your last visit? Yes [facility] and buzzing, 05/2020    2. Have you seen or consulted any other health care providers outside of the [address] [place] [address] since your last visit? Include any pap smears or colon screening. No    Does the patient need refills? NO    Learning Assessment Complete?  yes  Depression Screening complete in the past 12 months? yes

## 2020-06-16 ENCOUNTER — HOSPITAL ENCOUNTER (OUTPATIENT)
Dept: LAB | Age: 45
Discharge: HOME | End: 2020-06-16

## 2020-06-16 ENCOUNTER — LAB ONLY (OUTPATIENT)
Dept: FAMILY MEDICINE CLINIC | Age: 45
End: 2020-06-16

## 2020-06-16 DIAGNOSIS — E05.90 HYPERTHYROIDISM: ICD-10-CM

## 2020-06-16 PROCEDURE — 84443 ASSAY THYROID STIM HORMONE: CPT

## 2020-06-16 PROCEDURE — 84439 ASSAY OF FREE THYROXINE: CPT

## 2020-06-16 PROCEDURE — 84480 ASSAY TRIIODOTHYRONINE (T3): CPT

## 2020-06-16 NOTE — PROGRESS NOTES
Patient was here for labs only today, T3 total, TSH, T4 free were drawn and collected per Dr Alysha Luna on left arm w/o complaint or complications.

## 2020-06-17 LAB
COMMENT, HOLDF: NORMAL
SAMPLES BEING HELD,HOLD: NORMAL
T4 FREE SERPL-MCNC: 2 NG/DL (ref 0.8–1.5)
TSH SERPL DL<=0.05 MIU/L-ACNC: <0.01 UIU/ML (ref 0.36–3.74)

## 2020-06-18 ENCOUNTER — TELEPHONE (OUTPATIENT)
Dept: ENDOCRINOLOGY | Age: 45
End: 2020-06-18

## 2020-06-18 LAB — T3 SERPL-MCNC: 248 NG/DL (ref 71–180)

## 2020-06-18 RX ORDER — METHIMAZOLE 10 MG/1
20 TABLET ORAL DAILY
Qty: 180 TAB | Refills: 3 | Status: SHIPPED | OUTPATIENT
Start: 2020-06-18 | End: 2020-12-02

## 2020-06-18 NOTE — TELEPHONE ENCOUNTER
Please call him to let him know his thyroid levels are improving but are still too high so the 15 mg dose of methimazole is working better but still not quite enough. Please have him increase his dose to 20 mg daily (2 of the 10 mg tabs together in the morning) until he comes back in September and he will need another lab appointment prior to that visit. I updated his prescription at [facility] for this higher dose.

## 2020-06-18 NOTE — TELEPHONE ENCOUNTER
Patient telephoned and message from Dr. Alexandria Wallace reviewed with patient. Patient is able to verbalized back to the nurse that he will need to start taking 2 tablets of Methimazole.  [employer] coupon sent to patient's mobile number per is request.

## 2020-06-24 ENCOUNTER — OFFICE VISIT (OUTPATIENT)
Dept: FAMILY MEDICINE CLINIC | Age: 45
End: 2020-06-24

## 2020-06-24 VITALS — DIASTOLIC BLOOD PRESSURE: 90 MMHG | TEMPERATURE: 100.4 F | SYSTOLIC BLOOD PRESSURE: 140 MMHG

## 2020-06-24 DIAGNOSIS — R50.9 FEVER, UNSPECIFIED FEVER CAUSE: ICD-10-CM

## 2020-06-24 DIAGNOSIS — Z20.822 SUSPECTED 2019 NOVEL CORONAVIRUS INFECTION: ICD-10-CM

## 2020-06-24 DIAGNOSIS — R05.9 COUGH: Primary | ICD-10-CM

## 2020-06-24 NOTE — PROGRESS NOTES
HISTORY OF PRESENT ILLNESS  Darcy Harrison is a 40 y.o. male. HPI  Patient was scheduled to return for chronic condition today but states he really was waiting for the appointment because for the past 4-5 days he has had ear pains, that are now improved but now has a cough and a sore throat. The cough is mild non productive. He does not feel ill otherwise and did not know he had a temperature. Denies any sick contacts. Patient waited to see us today because he doesn't trust the testing sites and didn't want to go test and test positive even though he feels healthy. ROS  Cough  Sore throat  /90   Temp 100.4 °F (38 °C)   Physical Exam  Constitutional:       General: He is not in acute distress. Appearance: Normal appearance. He is not ill-appearing. Neurological:      Mental Status: He is alert. ASSESSMENT and PLAN  Diagnoses and all orders for this visit:    1. Cough    2. Fever, unspecified fever cause    3. Suspected 2019 novel coronavirus infection      Patient educated on the importance of testing,  I have given the information for the patient to have a test tomorrow. He should stay in self quarantine starting today and at least for 14 days and the symptoms have resolve.   We will do a follow up telemedicine visit in 1 week

## 2020-07-01 ENCOUNTER — OFFICE VISIT (OUTPATIENT)
Dept: FAMILY MEDICINE CLINIC | Age: 45
End: 2020-07-01

## 2020-07-01 DIAGNOSIS — Z20.822 SUSPECTED 2019 NOVEL CORONAVIRUS INFECTION: Primary | ICD-10-CM

## 2020-07-01 NOTE — PROGRESS NOTES
Coordination of Care  1. Have you been to the ER, urgent care clinic since your last visit? Hospitalized since your last visit? No    2. Have you seen or consulted any other health care providers outside of the [address] [place] [address] since your last visit? Include any pap smears or colon screening. No    Does the patient need refills? NO    Learning Assessment Complete? yes  Depression Screening complete in the past 12 months? yes  Per patient, pt is not currently home to check BP or any vitals at this time.

## 2020-07-01 NOTE — PROGRESS NOTES
HISTORY OF PRESENT ILLNESS  Jossy Villalobos is a 40 y.o. male. HPI  I was at home while conducting this encounter. Consent:  He and/or his healthcare decision maker is aware that this patient-initiated Telehealth encounter is a billable service, with coverage as determined by his insurance carrier. He is aware that he may receive a bill and has provided verbal consent to proceed: Yes    This virtual visit was conducted telephone encounter only. -  I affirm this is a Patient Initiated Episode with an Established Patient who has not had a related appointment within my department in the past 7 days or scheduled within the next 24 hours. Note: this encounter is not billable if this call serves to triage the patient into an appointment for the relevant concern. Total Time: minutes: 11-20 minutes. Patient states that after he saw us he decided to not be tested for COVID-19. Patient decided not to be tested. He is doing well,  135/79 is his blood pressure. Denies shortness of breath, no cough, no fever for the past week. He has stayed in self quarantine since he left our site last week and is planning to do so until 7/8/2020. No other concerns  ROS    Physical Exam    ASSESSMENT and PLAN  Diagnoses and all orders for this visit:    1. Suspected 2019 novel coronavirus infection      Patient is feeling better. He will stay in self quarantine until 7/8/20. He is scheduled to return for labs in august 2020.   Follow up as needed

## 2020-09-15 ENCOUNTER — LAB ONLY (OUTPATIENT)
Dept: FAMILY MEDICINE CLINIC | Age: 45
End: 2020-09-15

## 2020-09-15 ENCOUNTER — HOSPITAL ENCOUNTER (OUTPATIENT)
Dept: LAB | Age: 45
Discharge: HOME | End: 2020-09-15

## 2020-09-15 DIAGNOSIS — E05.90 HYPERTHYROIDISM: ICD-10-CM

## 2020-09-15 LAB
COMMENT, HOLDF: NORMAL
SAMPLES BEING HELD,HOLD: NORMAL
T4 FREE SERPL-MCNC: 1.2 NG/DL (ref 0.8–1.5)
TSH SERPL DL<=0.05 MIU/L-ACNC: 0.01 UIU/ML (ref 0.36–3.74)

## 2020-09-15 PROCEDURE — 84443 ASSAY THYROID STIM HORMONE: CPT

## 2020-09-15 PROCEDURE — 84439 ASSAY OF FREE THYROXINE: CPT

## 2020-09-15 PROCEDURE — 83520 IMMUNOASSAY QUANT NOS NONAB: CPT

## 2020-09-15 PROCEDURE — 84480 ASSAY TRIIODOTHYRONINE (T3): CPT

## 2020-09-15 NOTE — PROGRESS NOTES
Patient came for labs only. T4 free, TSH Receptor AB, T3 Total and TSH 3rd Generation, were drawn and collected by [facility], with no complications, on [place], as per Dr. Fiorella Doss.  Fredi Ruiz CMA

## 2020-09-16 ENCOUNTER — VIRTUAL VISIT (OUTPATIENT)
Dept: FAMILY MEDICINE CLINIC | Age: 45
End: 2020-09-16
Payer: SUBSIDIZED

## 2020-09-16 DIAGNOSIS — E05.90 HYPERTHYROIDISM: Primary | ICD-10-CM

## 2020-09-16 PROCEDURE — 99213 OFFICE O/P EST LOW 20 MIN: CPT | Performed by: INTERNAL MEDICINE

## 2020-09-16 NOTE — PROGRESS NOTES
Coordination of Care  1. Have you been to the ER, urgent care clinic since your last visit? Hospitalized since your last visit? No    2. Have you seen or consulted any other health care providers outside of the [address] [place] [address] since your last visit? Include any pap smears or colon screening. No    Does the patient need refills? NO    Learning Assessment Complete? yes  Depression Screening complete in the past 12 months?yes    I did intake today with Michelle Youngblood from [facility]  services.  Henry Gentile RN

## 2020-09-16 NOTE — PROGRESS NOTES
Chief Complaint   Patient presents with    Thyroid Problem      [phone] and doxy. me       **THIS IS A VIRTUAL VISIT VIA A VIDEO SYNCHRONOUS DISCUSSION. PATIENT AGREED TO HAVE THEIR CARE DELIVERED OVER A DOXY. ME VIDEO VISIT IN PLACE OF THEIR REGULARLY SCHEDULED OFFICE VISIT**    History of Present Illness: Teressa Membreno is a 39 y.o. male here for follow up of thyroid. No chest pain or palpitations. Energy is better on the methimazole 20 mg dose and has been compliant with this. Taking the lower dose of toprol XL 50 mg daily. Current Outpatient Medications   Medication Sig    methIMAzole (TAPAZOLE) 10 mg tablet Take 2 Tabs by mouth daily. New higher dose replaces prior script on file    metoprolol succinate (TOPROL-XL) 50 mg XL tablet Take 1 Tab by mouth daily. Delete the 100 mg tabs from profile     No current facility-administered medications for this visit. Allergies   Allergen Reactions    Nabumetone Itching     Review of Systems: PER HPI    Physical Examination:  - GENERAL: NCAT, Appears well nourished   - EYES: EOMI, non-icteric, no proptosis   - Ear/Nose/Throat: NCAT, no visible inflammation or masses   - CARDIOVASCULAR: no cyanosis, no visible JVD   - RESPIRATORY: respiratory effort normal without any distress or labored breathing   - MUSCULOSKELETAL: Normal ROM of neck and upper extremities observed   - SKIN: No rash on face  - NEUROLOGIC:  No facial asymmetry (Cranial nerve 7 motor function), No gaze palsy   - PSYCHIATRIC: Normal affect, Normal insight and judgement       Data Reviewed:   Component      Latest Ref Rng & Units 9/15/2020 9/15/2020           2:20 PM  2:20 PM   T4, Free      0.8 - 1.5 NG/DL 1.2    TSH      0.36 - 3.74 uIU/mL  0.01 (L)       Assessment/Plan:     1.  Hyperthyroidism: Went to [facility] on [facility]20 and TSH was <0.006 and was given Toprol XL 50 mg daily and then saw Dr. Kassidy Schwartz on 2/6/20 and his TSH was <0.01, FT4 was high at 3.6 and T3 was high at 338 and TPO ab was normal at 8 and TG ab was <1.0. He had a thyroid ultrasound that showed enlarged heterogeneous hypervascular thyroid gland without discrete nodule. Prominent bilateral cervical lymph nodes. His Toprol XL was increased to 100 mg daily. He returned to see Dr. Jun Kirk on 2/20/20 to review his lab results and was prescribed methimazole 5 mg bid but was told to start with just 1 tab daily and see how he felt so he has just been taking this dose. Given his FT4 and T3 levels are about 2x normal, I think he would benefit from taking 10 mg of methimazole daily to more quickly normalize his levels so asked him to go up to this dose until he comes back to see me in 6 weeks. Will keep him on Toprol XL for now but hopefully can taper off this in the future once his thyroid is better controlled. Repeat labs showed TSH < 0.01, FT4 3.5, FT3 13.5 and TRAb 29.7 in 4/20 confirming he has Grave's disease. Increased his dose to 15 mg for the next 2 months and TSH < 0.01, FT4 2.0 and T3 248 in 6/20 so increased to 20 mg daily. TSH 0.01 and FT4 normal at 1.2 so kept dose the same until next visit. - cont methimazole 20 mg daily  - cont Toprol XL 50 mg daily  - check TSH and free T4 prior to next visit  - check TSH receptor ab in the spring of 2021       Follow-up and Dispositions    · Return 12/2/20 at 2:20pm.       Lab follow up: 9/17/20  Component      Latest Ref Rng & Units 9/15/2020 9/15/2020           2:20 PM  2:20 PM   T3, total      71 - 180 ng/dL  135   Thyrotropin Receptor Ab, serum      0.00 - 1.75 IU/L 20.80 (H)      T3 normal and TSH receptor ab improved from 29 to 20 so Grave's disease is improving but still recommend keeping his dose the same until December.

## 2020-09-17 LAB
T3 SERPL-MCNC: 135 NG/DL (ref 71–180)
TSH RECEP AB SER-ACNC: 20.8 IU/L (ref 0–1.75)

## 2020-11-11 ENCOUNTER — TELEPHONE (OUTPATIENT)
Dept: FAMILY MEDICINE CLINIC | Age: 45
End: 2020-11-11

## 2020-11-11 ENCOUNTER — OFFICE VISIT (OUTPATIENT)
Dept: FAMILY MEDICINE CLINIC | Age: 45
End: 2020-11-11

## 2020-11-11 VITALS — HEART RATE: 84 BPM | TEMPERATURE: 98.5 F | SYSTOLIC BLOOD PRESSURE: 148 MMHG | DIASTOLIC BLOOD PRESSURE: 83 MMHG

## 2020-11-11 DIAGNOSIS — M54.41 CHRONIC BILATERAL LOW BACK PAIN WITH BILATERAL SCIATICA: Primary | ICD-10-CM

## 2020-11-11 DIAGNOSIS — M51.26 HERNIATED LUMBAR INTERVERTEBRAL DISC: ICD-10-CM

## 2020-11-11 DIAGNOSIS — M54.42 CHRONIC BILATERAL LOW BACK PAIN WITH BILATERAL SCIATICA: Primary | ICD-10-CM

## 2020-11-11 DIAGNOSIS — G89.29 CHRONIC BILATERAL LOW BACK PAIN WITH BILATERAL SCIATICA: Primary | ICD-10-CM

## 2020-11-11 PROCEDURE — 99213 OFFICE O/P EST LOW 20 MIN: CPT | Performed by: FAMILY MEDICINE

## 2020-11-11 RX ORDER — METHYLPREDNISOLONE 4 MG/1
TABLET ORAL
COMMUNITY
End: 2020-11-25

## 2020-11-11 RX ORDER — CYCLOBENZAPRINE HCL 10 MG
10 TABLET ORAL
Qty: 30 TAB | Refills: 1 | Status: SHIPPED | OUTPATIENT
Start: 2020-11-11 | End: 2020-11-25

## 2020-11-11 RX ORDER — HYDROCODONE BITARTRATE AND ACETAMINOPHEN 5; 325 MG/1; MG/1
TABLET ORAL
COMMUNITY
End: 2020-11-25

## 2020-11-11 NOTE — PROGRESS NOTES
HISTORY OF PRESENT ILLNESS  Yao Ross is a 39 y.o. male. HPI  Patient states 10 years ago he had the first sciatica event. States he had some x rays then and was told he likely had a herniated disc. Since then he has on and off pains. Now this past week he had an episode that was extremely painful. This episode affected his low back and radiated to both lower extremities. Patient denies urinary or fecal incontinence. He had to visit the urgent care over the weekend where he was prescribed a steroid pack and hydrocodone. States the pain is about the same right now. States the medications are not helping. Review of Systems   Constitutional: Negative for chills, fever, malaise/fatigue and weight loss. Eyes: Negative for redness. Cardiovascular: Negative for chest pain, palpitations and orthopnea. Gastrointestinal: Negative for abdominal pain, heartburn, nausea and vomiting. Genitourinary: Negative for dysuria, frequency and urgency. Musculoskeletal: Positive for back pain. Skin: Negative for rash. BP (!) 148/83 (BP 1 Location: Left arm, BP Patient Position: Sitting)   Pulse 84   Temp 98.5 °F (36.9 °C) (Temporal)   Physical Exam  Constitutional:       General: He is not in acute distress. Appearance: He is not ill-appearing. HENT:      Right Ear: Tympanic membrane normal.      Left Ear: Tympanic membrane normal.   Pulmonary:      Effort: Pulmonary effort is normal. No respiratory distress. Breath sounds: No wheezing or rhonchi. Abdominal:      General: Bowel sounds are normal. There is no distension. Palpations: Abdomen is soft. There is no mass. Musculoskeletal:      Comments: bilateral paraspinal muscle stiffness and tenderness, lumbar area   Neurological:      Mental Status: He is alert. ASSESSMENT and PLAN  Diagnoses and all orders for this visit:    1.  Chronic bilateral low back pain with bilateral sciatica  -     MRI LUMB SPINE WO CONT; Future  - cyclobenzaprine (FLEXERIL) 10 mg tablet; Take 1 Tab by mouth three (3) times daily as needed for Muscle Spasm(s). 2. Herniated lumbar intervertebral disc  -     MRI LUMB SPINE WO CONT; Future      Procedure: tender points in the lumbar spine injected with 2 mL lidocaine / 40 mg kenalog mix.   2 points in the right side and 1 point in the left     We will order MRI of the lumbar spine  Patient may need to be referred to spine specialist once we have the results  Weight loss recommended  Flexeril prescription sent

## 2020-11-11 NOTE — PROGRESS NOTES
Coordination of Care  1. Have you been to the ER, urgent care clinic since your last visit? Hospitalized since your last visit? Yes [facility], back pain    2. Have you seen or consulted any other health care providers outside of the [address] [place] [address] since your last visit? Include any pap smears or colon screening. No    Does the patient need refills YES    Learning Assessment Complete?  yes  Depression Screening complete in the past 12 months? yes

## 2020-11-11 NOTE — TELEPHONE ENCOUNTER
Called central scheduling to schedule the MRI for the patient- MRI scheduled for 11/24 at 7:45 PM with an arrival time of 7:15 PM. Pt is to check in through the emergency room but to tell them he has an MRI specifically scheduled already and not to let them check him in as a walk-in patient. Pt informed to bring a bag full of his medications for identification, to not wear any metal to the appointment including jewelry, and to bring photo ID. Pt verbalized understanding and denied having any further questions. CAV  Claudette Phillips used to translate. Makayla Means.  [place], [address] [facility]

## 2020-11-17 ENCOUNTER — LAB ONLY (OUTPATIENT)
Dept: FAMILY MEDICINE CLINIC | Age: 45
End: 2020-11-17

## 2020-11-17 ENCOUNTER — HOSPITAL ENCOUNTER (OUTPATIENT)
Dept: LAB | Age: 45
Discharge: HOME | End: 2020-11-17

## 2020-11-17 DIAGNOSIS — E05.90 HYPERTHYROIDISM: ICD-10-CM

## 2020-11-17 PROCEDURE — 84443 ASSAY THYROID STIM HORMONE: CPT

## 2020-11-17 PROCEDURE — 84439 ASSAY OF FREE THYROXINE: CPT

## 2020-11-17 NOTE — PROGRESS NOTES
Pt was here from lab only today. TSh and T4 free  Were draw collected without complain or complication.   Per Sebastien Eng

## 2020-11-18 LAB
T4 FREE SERPL-MCNC: 1 NG/DL (ref 0.8–1.5)
TSH SERPL DL<=0.05 MIU/L-ACNC: 6.09 UIU/ML (ref 0.36–3.74)

## 2020-11-24 ENCOUNTER — HOSPITAL ENCOUNTER (OUTPATIENT)
Dept: MRI IMAGING | Age: 45
Discharge: HOME | End: 2020-11-24
Attending: FAMILY MEDICINE
Payer: SUBSIDIZED

## 2020-11-24 DIAGNOSIS — M51.26 HERNIATED LUMBAR INTERVERTEBRAL DISC: ICD-10-CM

## 2020-11-24 DIAGNOSIS — G89.29 CHRONIC BILATERAL LOW BACK PAIN WITH BILATERAL SCIATICA: ICD-10-CM

## 2020-11-24 DIAGNOSIS — M54.42 CHRONIC BILATERAL LOW BACK PAIN WITH BILATERAL SCIATICA: ICD-10-CM

## 2020-11-24 DIAGNOSIS — M54.41 CHRONIC BILATERAL LOW BACK PAIN WITH BILATERAL SCIATICA: ICD-10-CM

## 2020-11-24 PROCEDURE — 72148 MRI LUMBAR SPINE W/O DYE: CPT

## 2020-11-25 ENCOUNTER — TELEPHONE (OUTPATIENT)
Dept: FAMILY MEDICINE CLINIC | Age: 45
End: 2020-11-25

## 2020-11-25 ENCOUNTER — OFFICE VISIT (OUTPATIENT)
Dept: FAMILY MEDICINE CLINIC | Age: 45
End: 2020-11-25

## 2020-11-25 VITALS
SYSTOLIC BLOOD PRESSURE: 138 MMHG | DIASTOLIC BLOOD PRESSURE: 88 MMHG | WEIGHT: 215 LBS | BODY MASS INDEX: 35.82 KG/M2 | HEIGHT: 65 IN | HEART RATE: 90 BPM | TEMPERATURE: 97 F

## 2020-11-25 DIAGNOSIS — M54.42 CHRONIC BILATERAL LOW BACK PAIN WITH BILATERAL SCIATICA: Primary | ICD-10-CM

## 2020-11-25 DIAGNOSIS — E66.01 SEVERE OBESITY (HCC): ICD-10-CM

## 2020-11-25 DIAGNOSIS — M54.41 CHRONIC BILATERAL LOW BACK PAIN WITH BILATERAL SCIATICA: Primary | ICD-10-CM

## 2020-11-25 DIAGNOSIS — G89.29 CHRONIC BILATERAL LOW BACK PAIN WITH BILATERAL SCIATICA: Primary | ICD-10-CM

## 2020-11-25 DIAGNOSIS — M51.26 HERNIATED LUMBAR DISC WITHOUT MYELOPATHY: ICD-10-CM

## 2020-11-25 DIAGNOSIS — Z71.3 DIETARY COUNSELING AND SURVEILLANCE: Primary | ICD-10-CM

## 2020-11-25 PROCEDURE — 97802 MEDICAL NUTRITION INDIV IN: CPT

## 2020-11-25 PROCEDURE — 99213 OFFICE O/P EST LOW 20 MIN: CPT | Performed by: FAMILY MEDICINE

## 2020-11-25 RX ORDER — GABAPENTIN 300 MG/1
300 CAPSULE ORAL
Qty: 60 CAP | Refills: 0 | Status: SHIPPED | OUTPATIENT
Start: 2020-11-25 | End: 2021-02-03

## 2020-11-25 RX ORDER — CYCLOBENZAPRINE HCL 10 MG
10 TABLET ORAL
Qty: 30 TAB | Refills: 1 | Status: SHIPPED | OUTPATIENT
Start: 2020-11-25 | End: 2021-03-23 | Stop reason: ALTCHOICE

## 2020-11-25 NOTE — PROGRESS NOTES
HISTORY OF PRESENT ILLNESS  Toby Vance is a 39 y.o. male. HPI  Patient states the lumbar spine pain radiates down to the right lower extremity. In the right lower extremity is worse and it increases when it reaches the lateral aspect of the right knee. He is assisting his gait with a cane because of the pain. Review of Systems   Constitutional: Negative for chills, fever, malaise/fatigue and weight loss. Eyes: Negative for redness. Cardiovascular: Negative for chest pain, palpitations and orthopnea. Gastrointestinal: Negative for abdominal pain, heartburn, nausea and vomiting. Genitourinary: Negative for dysuria, frequency and urgency. Musculoskeletal: Positive for back pain. Lower extremity pains   Skin: Negative for rash. /88 (BP 1 Location: Left arm, BP Patient Position: Sitting)   Pulse 90   Temp 97 °F (36.1 °C)   Ht 5' 4.69\" (1.643 m)   Wt 215 lb (97.5 kg)   BMI 36.13 kg/m²   Physical Exam  Constitutional:       General: He is not in acute distress. Appearance: He is not ill-appearing. HENT:      Right Ear: Tympanic membrane normal.      Left Ear: Tympanic membrane normal.   Pulmonary:      Effort: Pulmonary effort is normal. No respiratory distress. Breath sounds: No wheezing or rhonchi. Abdominal:      General: Bowel sounds are normal. There is no distension. Palpations: Abdomen is soft. There is no mass. Musculoskeletal:      Comments: bilateral paraspinal muscle stiffness and tenderness, lumbar area   Neurological:      Mental Status: He is alert. ASSESSMENT and PLAN  Diagnoses and all orders for this visit:    1. Chronic bilateral low back pain with bilateral sciatica  -     cyclobenzaprine (FLEXERIL) 10 mg tablet; Take 1 Tab by mouth three (3) times daily as needed for Muscle Spasm(s). -     gabapentin (NEURONTIN) 300 mg capsule; Take 1 Cap by mouth ACB/HS.  Max Daily Amount: 600 mg.  -     REFERRAL TO NUTRITION  -     REFERRAL TO NEUROSURGERY  -     REFERRAL TO PHYSICAL THERAPY    2. Severe obesity ([address]    3. Herniated lumbar disc without myelopathy  -     gabapentin (NEURONTIN) 300 mg capsule; Take 1 Cap by mouth ACB/HS. Max Daily Amount: 600 mg.  -     REFERRAL TO NUTRITION  -     REFERRAL TO NEUROSURGERY  -     REFERRAL TO PHYSICAL THERAPY      MRI reviewed with patient he has herniated discs and spinal stenosis.   We will try gabapentin, side effects discussed    We will refer to nutrition to try to achieve weight loss,  Target weight is 180 lbs  Refer to physical therapy and neurosurgery  Follow up as needed

## 2020-11-25 NOTE — PROGRESS NOTES
Lior :[id number]  DATE: 2020    REFERRING PROVIDER: Dheeraj Chang MD   NAME: Jazzmine Cantu : 1975 AGE: 39 y.o. GENDER: male  REASON FOR VISIT: Weight Management    ASSESSMENT: Patient  shares he would like to lose weight, and he has been successful at losing weight in the past as well as exercising regularly. He shares the whole family plans to make changes, and he would like to start making changes this week. Goal weight is 180 lbs. Past Medical Hx: Obesity, Hyperthyroidism    LABS:   Lab Results   Component Value Date/Time    Hemoglobin A1c 5.5 10/09/2015 11:15 AM     BP Readings from Last 1 Encounters:   20 138/88     MEDICATIONS/SUPPLEMENTS:   Prior to Admission medications    Medication Sig Start Date End Date Taking? Authorizing Provider   cyclobenzaprine (FLEXERIL) 10 mg tablet Take 1 Tab by mouth three (3) times daily as needed for Muscle Spasm(s). 20   Jazzmine Cantu MD   gabapentin (NEURONTIN) 300 mg capsule Take 1 Cap by mouth ACB/HS. Max Daily Amount: 600 mg. 20   Jazzmine Cantu MD   HYDROcodone-acetaminophen [facility]) 5-325 mg per tablet Take  by mouth. 20  Provider, Historical   methylPREDNISolone (MEDROL DOSEPACK) 4 mg tablet Take  by mouth. 20  Provider, Historical   cyclobenzaprine (FLEXERIL) 10 mg tablet Take 1 Tab by mouth three (3) times daily as needed for Muscle Spasm(s). 20  Jazzmine Cantu MD   methIMAzole (TAPAZOLE) 10 mg tablet Take 2 Tabs by mouth daily. New higher dose replaces prior script on file 20   Haritha Fairchild MD   metoprolol succinate (TOPROL-XL) 50 mg XL tablet Take 1 Tab by mouth daily. Delete the 100 mg tabs from profile 4/15/20   Haritha Fairchild MD       FOOD ALLERGIES/INTOLERANCES: No known allergies.      ANTHROPOMETRICS:    Ht Readings from Last 1 Encounters:   20 5' 4.69\" (1.643 m)      Wt Readings from Last 1 Encounters:   20 215 lb (97.5 kg) BMI: 36.13 Category: Obesity Class II    Reported Wt Hx:  Wt Readings from Last 4 Encounters:   11/25/20 215 lb (97.5 kg)   03/04/20 201 lb 12.8 oz (91.5 kg)   02/20/20 200 lb 6.4 oz (90.9 kg)   02/06/20 201 lb (91.2 kg)     Estimated Nutritional Needs: 2140 kcals/day (MSJ x 1.2) for weight maintenance                                                     1640 kcals/day (MSJ x 1.2 - 500) for weight loss  Reported Diet Hx: 24 Hour Diet Recall  Breakfast  Cereal with almonds and 2% milk or wife's homemade scrambled eggs   Lunch  4 tortillas, about a 1 - 1/2 cups of rice, or french fries   Dinner  4 tortillas, about a 1 - 1/2 cups of rice, or french fries   Snacks  -   Beverages  -     Exercise/Physical Actvity: Limited. Not meeting minimum physical activity guidelines. Environmental/Social: Patient shares that household currently does not have concerns for food access. NUTRITION INTERVENTION: Talked about the health benefits of weight loss, and discussed how diet changes and exercise can result in weight loss. Explained that it is best to set small goals and gradually lose weight. Helped to brainstorm specific goals to accomplish before next appointment. Shared [facility] [place] [facility]. Patient asked about trying multivitamin, will follow-up at next appointment. PATIENT GOALS:  - Eat using the My Healthy Plate pattern for lunch or dinner 4 times a week    Specific tips and techniques to facilitate compliance with above recommendations were provided and discussed. Pt was strongly encouraged to begin making necessary changes now. Will follow-up with Hannah Sanchez in 2 weeks to review progress towards goals. Appointment scheduled for December 9, 2020.            Beka Aquino RD

## 2020-11-25 NOTE — PROGRESS NOTES
Pt speaks English; informed him about AN program and mandatory meeting with the ORW to see if he qualifies, patient states that he is familiar as he has been active with AN prior. Pt verbalized understanding, denied having any further questions at this time. Gianna Valencia.  [place], [facility]

## 2020-11-25 NOTE — PROGRESS NOTES
With the assistance of Rosaura Philip, [facility] Derek Juan , reviewed AN program and required financial meeting with the ORW. Pt verbalized understanding and consented to meeting with the [address]. Denied having any further questions. Gagandeep Eugene.  [place], [facility]

## 2020-11-25 NOTE — TELEPHONE ENCOUNTER
After patient left the office, I called to offer and schedule a nutrition appointment. No answer. I created a tickler appointment, but someone already spoke to him and scheduled the appointment.

## 2020-11-25 NOTE — PROGRESS NOTES
Coordination of Care  1. Have you been to the ER, urgent care clinic since your last visit? Hospitalized since your last visit? No    2. Have you seen or consulted any other health care providers outside of the [address] [place] [address] since your last visit? Include any pap smears or colon screening. No    Does the patient need refills? NO    Learning Assessment Complete?  yes  Depression Screening complete in the past 12 months? yes

## 2020-12-01 ENCOUNTER — OFFICE VISIT (OUTPATIENT)
Dept: FAMILY MEDICINE CLINIC | Age: 45
End: 2020-12-01

## 2020-12-01 DIAGNOSIS — Z71.89 COUNSELING AND COORDINATION OF CARE: Primary | ICD-10-CM

## 2020-12-01 PROCEDURE — 99080 SPECIAL REPORTS OR FORMS: CPT | Performed by: FAMILY MEDICINE

## 2020-12-01 NOTE — PROGRESS NOTES
Patient has been prescreened and scheduled for F2F appointment for document signing and to submit POI.

## 2020-12-02 ENCOUNTER — VIRTUAL VISIT (OUTPATIENT)
Dept: FAMILY MEDICINE CLINIC | Age: 45
End: 2020-12-02

## 2020-12-02 DIAGNOSIS — E05.00 GRAVES DISEASE: Primary | ICD-10-CM

## 2020-12-02 PROCEDURE — 99213 OFFICE O/P EST LOW 20 MIN: CPT | Performed by: INTERNAL MEDICINE

## 2020-12-02 RX ORDER — METHIMAZOLE 10 MG/1
10 TABLET ORAL DAILY
Qty: 180 TAB | Refills: 3
Start: 2020-12-02 | End: 2021-02-03

## 2020-12-02 RX ORDER — METOPROLOL SUCCINATE 50 MG/1
TABLET, EXTENDED RELEASE ORAL
Qty: 90 TAB | Refills: 3
Start: 2020-12-02 | End: 2020-12-16

## 2020-12-02 NOTE — PROGRESS NOTES
Chief Complaint   Patient presents with    Thyroid Problem     Mobile: [phone] (Preferred)        **THIS IS A VIRTUAL VISIT VIA A VIDEO SYNCHRONOUS DISCUSSION. PATIENT AGREED TO HAVE THEIR CARE DELIVERED OVER A DOXY. ME VIDEO VISIT IN PLACE OF THEIR REGULARLY SCHEDULED OFFICE VISIT**    History of Present Illness: Yao Ross is a 39 y.o. male here for follow up of thyroid. No chest pain or palpitations or tremors. Bowels are regular. Energy is good. No heat or cold intolerance. Compliant with methimazole and Toprol XL. Current Outpatient Medications   Medication Sig    cyclobenzaprine (FLEXERIL) 10 mg tablet Take 1 Tab by mouth three (3) times daily as needed for Muscle Spasm(s).  gabapentin (NEURONTIN) 300 mg capsule Take 1 Cap by mouth ACB/HS. Max Daily Amount: 600 mg.    methIMAzole (TAPAZOLE) 10 mg tablet Take 2 Tabs by mouth daily. New higher dose replaces prior script on file    metoprolol succinate (TOPROL-XL) 50 mg XL tablet Take 1 Tab by mouth daily. Delete the 100 mg tabs from profile     No current facility-administered medications for this visit. Allergies   Allergen Reactions    Nabumetone Itching     Review of Systems: PER HPI    Physical Examination:  - GENERAL: NCAT, Appears well nourished   - EYES: EOMI, non-icteric, no proptosis   - Ear/Nose/Throat: NCAT, no visible inflammation or masses   - CARDIOVASCULAR: no cyanosis, no visible JVD   - RESPIRATORY: respiratory effort normal without any distress or labored breathing   - MUSCULOSKELETAL: Normal ROM of neck and upper extremities observed   - SKIN: No rash on face  - NEUROLOGIC:  No facial asymmetry (Cranial nerve 7 motor function), No gaze palsy   - PSYCHIATRIC: Normal affect, Normal insight and judgement       Data Reviewed:   Component      Latest Ref Rng & Units 11/17/2020 11/17/2020           1:00 PM  1:00 PM   T4, Free      0.8 - 1.5 NG/DL 1.0    TSH      0.36 - 3.74 uIU/mL  6.09 (H)       Assessment/Plan:     1. Grave's disease: Went to [facility] on 1/26[facility]20 and TSH was <0.006 and was given Toprol XL 50 mg daily and then saw Dr. Caroline Sky on 2/6/20 and his TSH was <0.01, FT4 was high at 3.6 and T3 was high at 338 and TPO ab was normal at 8 and TG ab was <1.0. He had a thyroid ultrasound that showed enlarged heterogeneous hypervascular thyroid gland without discrete nodule. Prominent bilateral cervical lymph nodes. His Toprol XL was increased to 100 mg daily. He returned to see Dr. Caroline Sky on 2/20/20 to review his lab results and was prescribed methimazole 5 mg bid but was told to start with just 1 tab daily and see how he felt so he has just been taking this dose. Given his FT4 and T3 levels are about 2x normal, I think he would benefit from taking 10 mg of methimazole daily to more quickly normalize his levels so asked him to go up to this dose until he comes back to see me in 6 weeks. Will keep him on Toprol XL for now but hopefully can taper off this in the future once his thyroid is better controlled. Repeat labs showed TSH < 0.01, FT4 3.5, FT3 13.5 and TRAb 29.7 in 4/20 confirming he has Grave's disease. Increased his dose to 15 mg for the next 2 months and TSH < 0.01, FT4 2.0 and T3 248 in 6/20 so increased to 20 mg daily. TSH 0.01 and FT4 normal at 1.2 and TRAb 20.8 in 9/20 so kept dose the same until next visit.   TSH up to 6.09 in 11/20 so decreased to 10 mg daily and will taper off the Toprol XL  - decrease methimazole to 10 mg daily  - decrease Toprol XL 50 mg to 1/2 tab daily x 1 week then 1/2 tab every other day for 1 week then stop  - check TSH and free T4 prior to next visit  - check TSH receptor ab prior to next visit         Follow-up and Dispositions    · Return 2/3/21 at 2pm.               Copy sent to:  Mateus Grimes MD

## 2020-12-02 NOTE — PROGRESS NOTES
Coordination of Care  1. Have you been to the ER, urgent care clinic since your last visit? Hospitalized since your last visit? No    2. Have you seen or consulted any other health care providers outside of the [address] [place] [address] since your last visit? Include any pap smears or colon screening. No    Does the patient need refills? YES    Learning Assessment Complete? yes  Depression Screening complete in the past 12 months? yes     Renuka Mcknight was  for this call.  Zhanna Burgess RN

## 2020-12-03 ENCOUNTER — OFFICE VISIT (OUTPATIENT)
Dept: FAMILY MEDICINE CLINIC | Age: 45
End: 2020-12-03

## 2020-12-03 DIAGNOSIS — Z71.89 COUNSELING AND COORDINATION OF CARE: Primary | ICD-10-CM

## 2020-12-03 PROCEDURE — 99080 SPECIAL REPORTS OR FORMS: CPT | Performed by: FAMILY MEDICINE

## 2020-12-04 ENCOUNTER — TELEPHONE (OUTPATIENT)
Dept: FAMILY MEDICINE CLINIC | Age: 45
End: 2020-12-04

## 2020-12-04 NOTE — TELEPHONE ENCOUNTER
Lior : [id number]    Spoke with patient to follow-up regarding weight loss goals. Patient shares he has begun to make some changes, affirmed patient of progress made. Encouraged patient to continue on with progress.      Mark Pedro RD

## 2020-12-08 ENCOUNTER — PATIENT OUTREACH (OUTPATIENT)
Dept: FAMILY MEDICINE CLINIC | Age: 45
End: 2020-12-08

## 2020-12-09 ENCOUNTER — VIRTUAL VISIT (OUTPATIENT)
Dept: FAMILY MEDICINE CLINIC | Age: 45
End: 2020-12-09

## 2020-12-09 DIAGNOSIS — Z71.3 DIETARY COUNSELING AND SURVEILLANCE: Primary | ICD-10-CM

## 2020-12-09 PROCEDURE — 97803 MED NUTRITION INDIV SUBSEQ: CPT

## 2020-12-09 NOTE — PROGRESS NOTES
Virtual Visit - Phone Call  Follow-up Nutrition Appointment  Patient has agreed to a phone call appointment. : Dariusz Esposito  DATE: 2020    REFERRING PROVIDER: Lindy Carlos MD  NAME: Championfanny Randolphyessi : 1975 AGE: 39 y.o. GENDER: male  REASON FOR VISIT: Weight Management    ASSESSMENT: Patient shares that he has been making changes to diet since the last appointment. He has made the following changes: eating smaller portion, eating more vegetables, and eating more boiled or steamed foods. Past Medical Hx: Obesity, Hyperthyroidism    LABS:   Lab Results   Component Value Date/Time    Hemoglobin A1c 5.5 10/09/2015 11:15 AM     MEDICATIONS/SUPPLEMENTS:   Prior to Admission medications    Medication Sig Start Date End Date Taking? Authorizing Provider   metoprolol succinate (TOPROL-XL) 50 mg XL tablet Take 1/2 tab daily x 1 week and then 1/2 tab every other day for 1 week and then stop 20  Allison Rainey MD   methIMAzole (TAPAZOLE) 10 mg tablet Take 1 Tab by mouth daily. Dose change 20--updated med list--did not send prescription to the pharmacy 20   Allison Rainey MD   cyclobenzaprine (FLEXERIL) 10 mg tablet Take 1 Tab by mouth three (3) times daily as needed for Muscle Spasm(s). 20   Radha Bruce MD   gabapentin (NEURONTIN) 300 mg capsule Take 1 Cap by mouth ACB/HS. Max Daily Amount: 600 mg. 20   Radha Bruce MD       ANTHROPOMETRICS:    Unable to obtain new weight.    BMI: 36.13   Category: Obese Class II    Wt Readings from Last 4 Encounters:   20 215 lb (97.5 kg)   20 201 lb 12.8 oz (91.5 kg)   20 200 lb 6.4 oz (90.9 kg)   20 201 lb (91.2 kg)       Reported Diet Hx:     24 Hour Diet Recall  Breakfast  Honey bunches of oats with 2% milk   Lunch  Broccoli, carrots, and green squash and grilled chicken   Dinner  [employer] with lemon juice, saltine crackers    Snacks  1 Banana   Beverages  Water Current Exercise/Physical Activity: He has been trying to do a little more walking. Intervention:  Encouraged and affirmed patient of progress in diet changes. Encouraged patient to continue in healthful eating habits. Reminded patients that eating has a bigger impact on weight loss than exercise. Also reminded patient of remember his goal and his motivation. Review of Patient Goals:  - Eat using the My Healthy Plate pattern for lunch or dinner 4 times a week (Met) - continue goal    Specific tips and techniques to facilitate compliance with above recommendations were provided and discussed. Pt was strongly encouraged to begin making necessary changes now. Will follow-up with Nataliya Mcneal in 1 month to review progress towards goals.           Lucky Harada , RD

## 2021-01-05 ENCOUNTER — HOSPITAL ENCOUNTER (OUTPATIENT)
Dept: PHYSICAL THERAPY | Age: 46
Discharge: HOME | End: 2021-01-05
Payer: SUBSIDIZED

## 2021-01-05 PROCEDURE — 97016 VASOPNEUMATIC DEVICE THERAPY: CPT

## 2021-01-05 PROCEDURE — 97162 PT EVAL MOD COMPLEX 30 MIN: CPT

## 2021-01-05 PROCEDURE — 97014 ELECTRIC STIMULATION THERAPY: CPT

## 2021-01-05 NOTE — PROGRESS NOTES
Physical Therapy at [facility],   a part of [address] [place] [address], [address] [place] [address]  Phone: [phone]  Fax: [phone]    Plan of Care/Statement of Necessity for Physical Therapy Services  2-15    Patient name: Isael Crocker  : 1975  Provider#: [id number]  Referral source: KATHY Beaver      Medical/Treatment Diagnosis: Low back pain [M54.5]     Prior Hospitalization: see medical history        Comorbidities: GERD, Grave's Disease, HTN, Severe obesity  Prior Level of Function: Patient completed 20 minutes of exercise seldom or never. Medications: Verified on Patient Summary List    Start of Care: 20      Onset Date: Acute on chronic (recent flare-up 1 month ago)       The Plan of Care and following information is based on the information from the initial evaluation. Assessment/ key information: Pt is a very pleasant and motivated 39year old male who was referred to skilled PT for chronic low back with a herniated disc and sciatica. Pt reports primary complaints of chronic central and right-sided low back pain and R>L pain/tingling down posterior leg, primarily with walking. Based on examination, patient presents with symptoms consistent with right-sided lumbar radiculopathy as well as a lateral meniscus tear of his right knee. Evaluation Complexity History MEDIUM  Complexity : 1-2 comorbidities / personal factors will impact the outcome/ POC ; Examination MEDIUM Complexity : 3 Standardized tests and measures addressing body structure, function, activity limitation and / or participation in recreation  ;Presentation MEDIUM Complexity : Evolving with changing characteristics  ;   Overall Complexity Rating: MEDIUM    Problem List: pain affecting function, decrease ROM, decrease strength, impaired gait/ balance, decrease ADL/ functional abilitiies, decrease activity tolerance, decrease flexibility/ joint mobility and decrease transfer abilities   Treatment Plan may include any combination of the following: Therapeutic exercise, Therapeutic activities, Neuromuscular re-education, Physical agent/modality, Gait/balance training, Manual therapy, Patient education, Self Care training and Functional mobility training  Patient / Family readiness to learn indicated by: asking questions, trying to perform skills and interest  Persons(s) to be included in education: patient (P)  Barriers to Learning/Limitations: None  Patient Goal (s): Pain relief  Patient Self Reported Health Status: good  Rehabilitation Potential: fair    Short and Long Term Goals: To be accomplished in 16 treatments:   1. Pt will be independent and compliant with HEP. 2. Pt will improve FOTO score by the MCID demonstrating improved overall function with decreased pain or discomfort. 3. Pt will be able to ambulate >/= 20 minutes with LRAD before requiring rest break due to pain. 4. Pt will be able to sleep through the night without waking due to pain. 5. Pt will be able to perform sit-to-stand transfer without increased low back or knee pain. Frequency / Duration: Patient to be seen 1-2 times per week for 16 treatments. Patient/ Caregiver education and instruction: self care, activity modification and exercises    [x]  Plan of care has been reviewed with RICK Griffin, PT, DPT 1/5/2021     ________________________________________________________________________    I certify that the above Therapy Services are being furnished while the patient is under my care. I agree with the treatment plan and certify that this therapy is necessary.     [de-identified] Signature:____________________  Date:____________Time: _________

## 2021-01-05 NOTE — PROGRESS NOTES
PT INITIAL EVALUATION NOTE - [facility] 2-15    Patient Name: Gilberto Levin  Date:2021  : 1975  [x]  Patient  Verified  Payor: [address] [place] [address] / Plan: [place] / Product Type: Kristinivan Mimi /    In time:10:22 am  Out time:11:39 am  Total Treatment Time (min): 77  Total Timed Codes (min): 5  1:1 Treatment Time ( only): 5   Visit #: 1     Treatment Area: Low back pain [M54.5]    SUBJECTIVE  Pain Level (0-10 scale): 10 (with walking) 2 (with sitting)  Any medication changes, allergies to medications, adverse drug reactions, diagnosis change, or new procedure performed?: [] No    [x] Yes (see summary sheet for update)  Subjective:    Pt was referred to skilled PT for chronic low back pain with bilateral sciatica and a herniated lumbar disc. Today, Pt reports primary complaints of chronic central and right-sided low back pain and R>L pain/tingling down posterior leg, primarily with walking. Pain starts as heat/burning sensation within spine. Sciatica symptoms started about 1 month ago. Symptoms travel along the lateral and posterior aspect of the R>L leg down to the foot. He notes feeling \"a bone\" on the outside of his right knee that is very painful which also began about 1 month ago (he feels like this is the pain that is limiting his ability to walk); he notes this is severe with walking and thinks it is associated with his back pain. When he is experiencing significant pain, he can feel \"lumps\" along the spine. Pt denies incontinence or bowel/bladder issues. He does experience cracking within R knee which causes significant pain. Pt is currently ambulating with a cane when he leaves the house or has to walk more than 5 minutes. He does not use cane at home. The doctor who took the MRI said he could not walk or drive anymore, though he has followed up with another doctor who has differed in opinion.      Mechanism of Injury: No ROSALIND: First low back pain symptoms 10 years ago and Pt was diagnosed with herniated disc. Since then, he has had pain of and off, though did have 4 years without any pain. The pain has been about the same overall since that time, however, he has recently been experiencing down his legs and right knee in the past month. Will be scheduling a neurosurgery appt. Aggravating factors include standing/walking 5+ minutes (10/10). Relieving factors include 600 mg ibuprofen, sitting. He was prescribed gabapentin which has not provided any relief. 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 Recent MRI: Large L3-4 central disc extrusion, multilevel mild stenosis and a congenitally slender lumbar spinal canal     Pt Goals: \"I would like a miracle to happen. I would like pain to go away. \"     OBJECTIVE    Posture:  Significant L trunk lean in standing  Other Observations:  --  Gait and Functional Mobility:  Significant left trunk lean, decreased right stance time, R antalgic gait  Palpation: TTP R S1, R lumbar multifidus, right gluteus medius and piriformis musculature, right lateral meniscus        Lumbar AROM:          R  L    Flexion    50-75% low back and R knee pain      Extension   25% R sided low back pain to right knee      Side Bending   50% significant low back pain to right knee 50-75% eased pain in knee    Rotation   50% significant pain down to R knee  25% pain down to right knee        LOWER QUARTER   MUSCLE STRENGTH  KEY       R  L  0 - No Contraction  L1, L2 Psoas  5 knee p! 5  1 - Trace   L3 Quads  5 knee p! 5  2 - Poor   L4 Tib Ant  5  5  3 - Fair    L5 EHL  5  5  4 - Good   S1 Peroneals  5  5  5 - Normal   S2 Hams  5  5    Flexibility: Decreased flexibility of HS and hip flexor musculature     MMT: HIP Ext: 5 B              HIP Abd: R: 4; L 5  Neurological: Reflexes / Sensations: Intact light touch sensation along L2-S1 dermatomes  Special Tests:    Trendelenberg: (-)    FABERS: (-)   Forward Bend: (+)    Slump: (-)   H.S. SLR: 60 R pain posterior knee lateral knee; 70 L   Piriformis Ext: (-)   Tonio's: (+) mild    Modality rationale: decrease inflammation and decrease pain to improve the patients ability to ambulate with decreased pain or discomfort. Min Type Additional Details   15 [x] Estim: []Att   [x]Unatt        []TENS instruct                  [x]IFC  []Premod   []NMES                     []Other:  []w/US   [x]w/ice   []w/heat  Position: Supine  Location: R lumbar    []  Traction: [] Cervical       []Lumbar                       [] Prone          []Supine                       []Intermittent   []Continuous Lbs:  [] before manual  [] after manual  []w/heat    []  Ultrasound: []Continuous   [] Pulsed at:                           []1MHz   []3MHz Location:  W/cm2:    [] Paraffin         Location:   []w/heat    []  Ice     []  Heat  []  Ice massage Position:  Location:    []  Laser  []  Other: Position:  Location:   15   [x]  Vasopneumatic Device Pressure:       [] lo [x] med [] hi   Temperature: 34 (R knee)     [x] Skin assessment post-treatment:  [x]intact []redness- no adverse reaction    []redness  adverse reaction:     5 min Therapeutic Exercise:  [] See flow sheet : Pt education   Rationale: Enhance Pt understanding of pathology and impairments in order to appropriately modify activities. With   [] TE   [] TA   [] neuro   [] other: Patient Education: [x] Review HEP    [] Progressed/Changed HEP based on:   [] positioning   [] body mechanics   [] transfers   [] heat/ice application    [x] other: Educated Pt regarding impairments, role of PT, and POC.        Other Objective/Functional Measures: --    Pain Level (0-10 scale) post treatment: \"feels better\"    ASSESSMENT/Changes in Function:     [x]  See Plan of [address], PT, DPT 1/5/2021

## 2021-01-06 ENCOUNTER — TELEPHONE (OUTPATIENT)
Dept: FAMILY MEDICINE CLINIC | Age: 46
End: 2021-01-06

## 2021-01-07 ENCOUNTER — HOSPITAL ENCOUNTER (OUTPATIENT)
Dept: PHYSICAL THERAPY | Age: 46
Discharge: HOME | End: 2021-01-07
Payer: SUBSIDIZED

## 2021-01-07 PROCEDURE — 97110 THERAPEUTIC EXERCISES: CPT

## 2021-01-07 PROCEDURE — 97016 VASOPNEUMATIC DEVICE THERAPY: CPT

## 2021-01-07 PROCEDURE — 97014 ELECTRIC STIMULATION THERAPY: CPT

## 2021-01-07 NOTE — PROGRESS NOTES
PT DAILY TREATMENT NOTE - [facility] 215    Patient Name: Danielle Hung  Date:2021  : 1975  [x]  Patient  Verified  Payor: [address] [place] [address] / Plan: [facility] / Product Type: Anni /    In time:5:25 pm  Out time: 6:25  Total Treatment Time (min): 60  Total Timed Codes (min): 45  1:1 Treatment Time ( [address] only): --   Visit #:  2    Treatment Area: Low back pain [M54.5]    SUBJECTIVE  Pain Level (0-10 scale): 8  Any medication changes, allergies to medications, adverse drug reactions, diagnosis change, or new procedure performed?: [x] No    [] Yes (see summary sheet for update)  Subjective functional status/changes:   [] No changes reported  Pt notes significant cramps/tightness in bilateral calves that woke him up this morning at 4 a.m. \"Lots of noise in the left knee. \"    OBJECTIVE    Modality rationale: decrease inflammation and decrease pain to improve the patients ability to perform ADLs with decreased pain or discomfort.    Min Type Additional Details      15 [x] Estim: []Att   [x]Unatt    []TENS instruct                  [x]IFC  []Premod   []NMES                     []Other:  []w/US   [x]w/ice   []w/heat  Position:   Location:       []  Traction: [] Cervical       []Lumbar                       [] Prone          []Supine                       []Intermittent   []Continuous Lbs:  [] before manual  [] after manual  []w/heat    []  Ultrasound: []Continuous   [] Pulsed                       at: []1MHz   []3MHz Location:  W/cm2:    [] Paraffin         Location:   []w/heat    []  Ice     []  Heat  []  Ice massage Position:  Location:    []  Laser  []  Other: Position:  Location:   15   [x]  Vasopneumatic Device Pressure:       [] lo [x] med [] hi   Temperature: 34     [x] Skin assessment post-treatment:  [x]intact []redness- no adverse reaction    []redness  adverse reaction:     45 min Therapeutic Exercise:  [] See flow sheet :   Rationale: increase ROM and increase strength to improve the patients ability to ambulate with decreased pain or discomfort. With   [] TE   [] TA   [] neuro   [] other: Patient Education: [x] Review HEP    [] Progressed/Changed HEP based on:   [] positioning   [] body mechanics   [] transfers   [] heat/ice application    [] other:      Other Objective/Functional Measures:   TTP distal rectus femoris and vastus lateralis      Pain Level (0-10 scale) post treatment: 7    ASSESSMENT/Changes in Function:    Limited session to mostly NWB activities today due to significant pain at this point, particularly in standing. Utilized right side glides secondary to significant left lean posture in standing. Utilized hip and lower extremity strengthening to decrease pain and improve gait mechanics; Pt tolerated well. Patient will continue to benefit from skilled PT services to modify and progress therapeutic interventions, address functional mobility deficits, address ROM deficits, address strength deficits, analyze and address soft tissue restrictions, analyze and cue movement patterns, analyze and modify body mechanics/ergonomics and assess and modify postural abnormalities to attain remaining goals. [x]  See Plan of Care  []  See progress note/recertification  []  See Discharge Summary         Progress towards goals / Updated goals:  Short and Long Term Goals: To be accomplished in 16 treatments:               1. Pt will be independent and compliant with HEP. 2. Pt will improve FOTO score by the MCID demonstrating improved overall function with decreased pain or discomfort. 3. Pt will be able to ambulate >/= 20 minutes with LRAD before requiring rest break due to pain. 4. Pt will be able to sleep through the night without waking due to pain. 5. Pt will be able to perform sit-to-stand transfer without increased low back or knee pain.     PLAN  [x]  Upgrade activities as tolerated     [x]  Continue plan of care  []  Update interventions per flow sheet       []  Discharge due to:_  []  Other:_      Farooq Laing PT, DPT 1/7/2021

## 2021-01-12 ENCOUNTER — HOSPITAL ENCOUNTER (OUTPATIENT)
Dept: PHYSICAL THERAPY | Age: 46
Discharge: HOME | End: 2021-01-12
Payer: SUBSIDIZED

## 2021-01-12 PROCEDURE — 97014 ELECTRIC STIMULATION THERAPY: CPT

## 2021-01-12 PROCEDURE — 97016 VASOPNEUMATIC DEVICE THERAPY: CPT

## 2021-01-12 PROCEDURE — 97110 THERAPEUTIC EXERCISES: CPT

## 2021-01-12 NOTE — PROGRESS NOTES
PT DAILY TREATMENT NOTE - [facility] 2-15    Patient Name: Deniz Sim  Date:2021  : 1975  [x]  Patient  Verified  Payor: [facility] / Plan: [facility] / Product Type: Henrietta Harrys /    In time:2:29 pm  Out time: 3:52 pm  Total Treatment Time (min): 83  Total Timed Codes (min): 68  1:1 Treatment Time ( only): --   Visit #:  3    Treatment Area: Low back pain [M54.5]    SUBJECTIVE  Pain Level (0-10 scale): 7  Any medication changes, allergies to medications, adverse drug reactions, diagnosis change, or new procedure performed?: [x] No    [] Yes (see summary sheet for update)  Subjective functional status/changes:   [] No changes reported  Pt notes he has had increased pain within R quadriceps and numbness/pain in lower anterior leg, particularly with right side glides. A bit less pain in lateral knee. OBJECTIVE    Modality rationale: decrease inflammation and decrease pain to improve the patients ability to perform ADLs with decreased pain or discomfort.    Min Type Additional Details      15 [x] Estim: []Att   [x]Unatt    []TENS instruct                  [x]IFC  []Premod   []NMES                     []Other:  []w/US   [x]w/ice   []w/heat  Position:   Location:       []  Traction: [] Cervical       []Lumbar                       [] Prone          []Supine                       []Intermittent   []Continuous Lbs:  [] before manual  [] after manual  []w/heat    []  Ultrasound: []Continuous   [] Pulsed                       at: []1MHz   []3MHz Location:  W/cm2:    [] Paraffin         Location:   []w/heat    []  Ice     []  Heat  []  Ice massage Position:  Location:    []  Laser  []  Other: Position:  Location:   15   [x]  Vasopneumatic Device Pressure:       [] lo [x] med [] hi   Temperature: 34     [x] Skin assessment post-treatment:  [x]intact []redness- no adverse reaction    []redness  adverse reaction:     68 min Therapeutic Exercise:  [] See flow sheet : Assessment and Re-assessment Rationale: increase ROM and increase strength to improve the patients ability to ambulate with decreased pain or discomfort. With   [] TE   [] TA   [] neuro   [] other: Patient Education: [x] Review HEP    [] Progressed/Changed HEP based on:   [] positioning   [] body mechanics   [] transfers   [] heat/ice application    [] other:      Other Objective/Functional Measures:   TTP distal rectus femoris and vastus lateralis      Pain Level (0-10 scale) post treatment: 6    ASSESSMENT/Changes in Function:    Peripheralization of symptoms with right and left side glides, as well as flexion/extension lumbar AROM; no specific directional preference identified, though does seem more intolerant of extension-based positions. Pt noted mild discomfort in lumbar spine with posterior pelvic tilts, though no peripheralization. Pt noted some increased lower leg and quad symptoms with quad set, though no increased pain with SAQs, likely resulting from more neutral spine. Relief of lower leg numbness/pain at end of session. Continue to monitor response to exercises and treatment and progress as tolerated. Patient will continue to benefit from skilled PT services to modify and progress therapeutic interventions, address functional mobility deficits, address ROM deficits, address strength deficits, analyze and address soft tissue restrictions, analyze and cue movement patterns, analyze and modify body mechanics/ergonomics and assess and modify postural abnormalities to attain remaining goals. [x]  See Plan of Care  []  See progress note/recertification  []  See Discharge Summary         Progress towards goals / Updated goals:  Short and Long Term Goals: To be accomplished in 16 treatments:               1. Pt will be independent and compliant with HEP. 2. Pt will improve FOTO score by the MCID demonstrating improved overall function with decreased pain or discomfort.                 3. Pt will be able to ambulate >/= 20 minutes with LRAD before requiring rest break due to pain. 4. Pt will be able to sleep through the night without waking due to pain. 5. Pt will be able to perform sit-to-stand transfer without increased low back or knee pain.     PLAN  [x]  Upgrade activities as tolerated     [x]  Continue plan of care  []  Update interventions per flow sheet       []  Discharge due to:_  []  Other:_      Marcella Grey PT, DPT 1/12/2021

## 2021-01-14 ENCOUNTER — HOSPITAL ENCOUNTER (OUTPATIENT)
Dept: PHYSICAL THERAPY | Age: 46
Discharge: HOME | End: 2021-01-14
Payer: SUBSIDIZED

## 2021-01-14 PROCEDURE — 97016 VASOPNEUMATIC DEVICE THERAPY: CPT

## 2021-01-14 PROCEDURE — 97014 ELECTRIC STIMULATION THERAPY: CPT

## 2021-01-14 PROCEDURE — 97110 THERAPEUTIC EXERCISES: CPT

## 2021-01-14 NOTE — PROGRESS NOTES
PT DAILY TREATMENT NOTE - [facility] 2-15    Patient Name: Abdoul Herron  Date:2021  : 1975  [x]  Patient  Verified  Payor: [address] / Plan: [facility] / Product Type: [employer] /    In time: 11:05 am  Out time: 12:10 pm  Total Treatment Time (min): 65  Total Timed Codes (min): 55  1:1 Treatment Time ([facility] only): --   Visit #:  4    Treatment Area: Low back pain [M54.5]    SUBJECTIVE  Pain Level (0-10 scale): 7  Any medication changes, allergies to medications, adverse drug reactions, diagnosis change, or new procedure performed?: [x] No    [] Yes (see summary sheet for update)  Subjective functional status/changes:   [] No changes reported  Pt reports feeling a bit better and less pain distal to knee since last session. OBJECTIVE    Modality rationale: decrease inflammation and decrease pain to improve the patients ability to perform ADLs with decreased pain or discomfort.    Min Type Additional Details      10 [x] Estim: []Att   [x]Unatt    []TENS instruct                  [x]IFC  []Premod   []NMES                     []Other:  []w/US   [x]w/ice   []w/heat  Position:   Location:       []  Traction: [] Cervical       []Lumbar                       [] Prone          []Supine                       []Intermittent   []Continuous Lbs:  [] before manual  [] after manual  []w/heat    []  Ultrasound: []Continuous   [] Pulsed                       at: []1MHz   []3MHz Location:  W/cm2:    [] Paraffin         Location:   []w/heat    []  Ice     []  Heat  []  Ice massage Position:  Location:    []  Laser  []  Other: Position:  Location:   10   [x]  Vasopneumatic Device Pressure:       [] lo [x] med [] hi   Temperature: 34     [x] Skin assessment post-treatment:  [x]intact []redness- no adverse reaction    []redness  adverse reaction:     55 min Therapeutic Exercise:  [] See flow sheet : Assessment and Re-assessment   Rationale: increase ROM and increase strength to improve the patients ability to ambulate with decreased pain or discomfort. With   [] TE   [] TA   [] neuro   [] other: Patient Education: [x] Review HEP    [] Progressed/Changed HEP based on:   [] positioning   [] body mechanics   [] transfers   [] heat/ice application    [] other:      Other Objective/Functional Measures:   TTP distal rectus femoris and vastus lateralis      Pain Level (0-10 scale) post treatment: 5    ASSESSMENT/Changes in Function:    Pt noted pain in lateral knee with right clam shells; cued Pt to decrease knee flexion, and Pt able to perform with significantly less pain. Attempted to introduce prone hip extension isometrics for some multifidus activation, however, Pt noted sharp pain within R>L lumbar spine. Pt responded very well to seated core rotations; instructed and cued Pt for correct performance with band in order to perform at home. Patient will continue to benefit from skilled PT services to modify and progress therapeutic interventions, address functional mobility deficits, address ROM deficits, address strength deficits, analyze and address soft tissue restrictions, analyze and cue movement patterns, analyze and modify body mechanics/ergonomics and assess and modify postural abnormalities to attain remaining goals. [x]  See Plan of Care  []  See progress note/recertification  []  See Discharge Summary         Progress towards goals / Updated goals:  Short and Long Term Goals: To be accomplished in 16 treatments:               1. Pt will be independent and compliant with HEP. 2. Pt will improve FOTO score by the MCID demonstrating improved overall function with decreased pain or discomfort. 3. Pt will be able to ambulate >/= 20 minutes with LRAD before requiring rest break due to pain. 4. Pt will be able to sleep through the night without waking due to pain.                5. Pt will be able to perform sit-to-stand transfer without increased low back or knee pain.    PLAN  [x]  Upgrade activities as tolerated     [x]  Continue plan of care  []  Update interventions per flow sheet       []  Discharge due to:_  []  Other:_      aJmes Harris PT, DPT 1/14/2021

## 2021-01-19 ENCOUNTER — HOSPITAL ENCOUNTER (OUTPATIENT)
Dept: PHYSICAL THERAPY | Age: 46
Discharge: HOME | End: 2021-01-19
Payer: SUBSIDIZED

## 2021-01-19 PROCEDURE — 97110 THERAPEUTIC EXERCISES: CPT

## 2021-01-19 PROCEDURE — 97140 MANUAL THERAPY 1/> REGIONS: CPT

## 2021-01-19 PROCEDURE — 97016 VASOPNEUMATIC DEVICE THERAPY: CPT

## 2021-01-19 NOTE — PROGRESS NOTES
PT DAILY TREATMENT NOTE - [facility] 2-15    Patient Name: Carlos A Whittington  Date:2021  : 1975  [x]  Patient  Verified  Payor: [facility] / Plan: [facility] / Product Type: Maddison Abdon /    In time:2:24P  Out time:3:45P  Total Treatment Time (min): 81  Total Timed Codes (min): 66  1:1 Treatment Time ( [address] only): --   Visit #:  5    Treatment Area: Low back pain [M54.5]    SUBJECTIVE  Pain Level (0-10 scale): 8  Any medication changes, allergies to medications, adverse drug reactions, diagnosis change, or new procedure performed?: [x]? No    []? Yes (see summary sheet for update)  Subjective functional status/changes:   []? No changes reported  Pt reports after about 10 minutes of walking, pain begins deep in hip and leg feels heavy. Pain within hip to lateral right knee is the main issue. He is still experiencing tingling/pain intermittently within anterior lower leg. Standing to wash dishes is challenging. Left seated trunk rotation increases pain, right seated trunk rotation decreases pain. He has only been able to do his exercises 2 days since last session.      OBJECTIVE            Modality rationale: decrease inflammation and decrease pain to improve the patients ability to perform ADLs with decreased pain or discomfort. Min Type Additional Details       []? Estim: []? Att   [x]? Unatt    []? TENS instruct                  []?IFC  []? Premod   []? NMES                     []?Other:  []?w/US   []?w/ice   []?w/heat  Position:  Location:         []? Traction: []? Cervical       []? Lumbar                       []? Prone          []? Supine                       []?Intermittent   []? Continuous Lbs:  []? before manual  []? after manual  []?w/heat     []? Ultrasound: []? Continuous   []? Pulsed                       at: []?1MHz   []? 3MHz Location:  W/cm2:     []? Paraffin         Location:   []?w/heat     []? Ice     []? Heat  []? Ice massage Position:  Location:     []? Laser  []?   Other: Position:  Location:   15    [x]? Vasopneumatic Device Pressure:       []? lo [x]? med []? hi   Temperature: 34      [x]? Skin assessment post-treatment:  [x]? intact []? redness- no adverse reaction    []? redness  adverse reaction:      55 min Therapeutic Exercise:  [x]? See flow sheet : Assessment and Re-assessment   Rationale: increase ROM and increase strength to improve the patients ability to ambulate with decreased pain or discomfort. 11 min Manual Therapy: IASTM to anterior compartment of the right lower leg, STM to R piriformis in left s/l   Rationale: decrease pain, increase ROM and increase tissue extensibility to improve the patients ability to ambulate with decreased pain or discomfort.        With   []? TE   []? TA   []? neuro   []? other: Patient Education: [x]? Review HEP    []? Progressed/Changed HEP based on:   []? positioning   []? body mechanics   []? transfers   []? heat/ice application    []? other:       Other Objective/Functional Measures:   TTP distal rectus femoris and vastus lateralis            Pain Level (0-10 scale) post treatment: 5      ASSESSMENT/Changes in Function:    Pt not presenting with any specific lumbar directional pattern; palpation of piriformis did reproduce tingling/pain within anterior lower leg. TTP L5-S1, piriformis, gluteus medius, lateral knee joint line, LC, lateral tibial plateau, tibialis anterior and anterior tibia. Pt able to tolerate progression of clam shells with band and piriformis stretch, though unable to perform standing hip abduction due to increased pain in RLE to right lower leg. Introduced piriformis stretch to decrease tightness and improve neural compression; Pt able to perform without increased pain. Activities within session are limited due to Pt poor positional tolerance due to pain.   Patient will continue to benefit from skilled PT services to modify and progress therapeutic interventions, address functional mobility deficits, address ROM deficits, address strength deficits, analyze and address soft tissue restrictions, analyze and cue movement patterns, analyze and modify body mechanics/ergonomics and assess and modify postural abnormalities to attain remaining goals. [x]? See Plan of Care  []? See progress note/recertification  []? See Discharge Summary         Progress towards goals / Updated goals:  Short and Long Term Goals: To be accomplished in 16 treatments:               1. Pt will be independent and compliant with HEP. - Progressing               2. Pt will improve FOTO score by the MCID demonstrating improved overall function with decreased pain or discomfort.                3. Pt will be able to ambulate >/= 20 minutes with LRAD before requiring rest break due to pain. - Progressing               4. Pt will be able to sleep through the night without waking due to pain.               5. Pt will be able to perform sit-to-stand transfer without increased low back or knee pain.     PLAN  [x]? Upgrade activities as tolerated     [x]? Continue plan of care  []? Update interventions per flow sheet       []? Discharge due to:_  []?   Other:_      Adolfo Small, PT, DPT 1/19/2021

## 2021-01-21 ENCOUNTER — HOSPITAL ENCOUNTER (OUTPATIENT)
Dept: PHYSICAL THERAPY | Age: 46
Discharge: HOME | End: 2021-01-21
Payer: SUBSIDIZED

## 2021-01-21 PROCEDURE — 97016 VASOPNEUMATIC DEVICE THERAPY: CPT

## 2021-01-21 PROCEDURE — 97014 ELECTRIC STIMULATION THERAPY: CPT

## 2021-01-21 PROCEDURE — 97110 THERAPEUTIC EXERCISES: CPT

## 2021-01-21 NOTE — PROGRESS NOTES
PT DAILY TREATMENT NOTE - [facility] 2-15    Patient Name: Ruth Chu  Date:2021  : 1975  [x]  Patient  Verified  Payor: [address] / Plan: [facility] / Product Type: Aaron [employer] /    In time:2:35 pm  Out time:3:33 pm  Total Treatment Time (min): 58  Total Timed Codes (min): 43  1:1 Treatment Time ( only): --   Visit #:  6    Treatment Area: Low back pain [M54.5]    SUBJECTIVE  Pain Level (0-10 scale): 5  Any medication changes, allergies to medications, adverse drug reactions, diagnosis change, or new procedure performed?: [x]? ? No    []? ? Yes (see summary sheet for update)  Subjective functional status/changes:   []? ? No changes reported  Pt reports he has been feeling better and has been experiencing less pain since last session.      OBJECTIVE                 Modality rationale: decrease inflammation and decrease pain to improve the patients ability to perform ADLs with decreased pain or discomfort.     Min Type Additional Details       15 [x]? ? Estim: []??Att   [x]? ? Unatt    []? ?TENS instruct                  []? ?IFC  [x]? ?Premod   []? ?NMES                     []? ? Other:  []??w/US   [x]? ?w/ice   []? ?w/heat  Position: Supine with feet elevated  Location: R lower leg        []? ?  Traction: []?? Cervical       []? ?Lumbar                       []? ? Prone          []? ?Supine                       []? ?Intermittent   []? ? Continuous Lbs:  []?? before manual  []? ? after manual  []? ?w/heat     []? ?  Ultrasound: []??Continuous   []? ? Pulsed                       at: []??1MHz   []? ? 3MHz Location:  W/cm2:     []? ? Paraffin         Location:   []??w/heat     []? ?  Ice     []? ?  Heat  []? ?  Ice massage Position:  Location:     []? ?  Laser  []? ?  Other: Position:  Location:   15    [x]? ?  Vasopneumatic Device Pressure:       []? ? lo [x]? ? med []?? hi   Temperature: 34      [x]? ? Skin assessment post-treatment:  [x]? ? intact []? ?redness- no adverse reaction    []? ?redness  adverse reaction:      38 min Therapeutic Exercise:  [x]? ? See flow sheet : Assessment and Re-assessment, R hip PROM (piriformis stretch)   Rationale: increase ROM and increase strength to improve the patients ability to ambulate with decreased pain or discomfort.      5 min Neuromuscular Re-education:  []  See flow sheet : hip ER/IR isometrics with PT resistance for improved muscle activation    Rationale: increase strength  to improve the patients ability to ambulate to ambulate with decreased pain or discomfort       With   []?? TE   []?? TA   []?? neuro   []?? other: Patient Education: [x]? ? Review HEP    []? ? Progressed/Changed HEP based on:   []?? positioning   []? ? body mechanics   []? ? transfers   []? ? heat/ice application    []? ? other:       Other Objective/Functional Measures:            Pain Level (0-10 scale) post treatment: 0      ASSESSMENT/Changes in Function:    Pt demonstrated improved tolerance today; added side-lying reverse clam shells for improved hip IR activation and strength with cuing for proper technique, and Pt able to perform without increased pain. Pt presenting with some compartment syndrome within right anterior lower leg; utilized e-stim and ice at end of session along this area to reduce pain and abnormal sensation and Pt responded very well. Patient will continue to benefit from skilled PT services to modify and progress therapeutic interventions, address functional mobility deficits, address ROM deficits, address strength deficits, analyze and address soft tissue restrictions, analyze and cue movement patterns, analyze and modify body mechanics/ergonomics and assess and modify postural abnormalities to attain remaining goals.      [x]? ?  See Plan of Care  []? ?  See progress note/recertification  []? ?  See Discharge Summary         Progress towards goals / Updated goals:  Short and Long Term Goals: To be accomplished in 16 treatments:               1.  Pt will be independent and compliant with HEP. - Progressing               2. Pt will improve FOTO score by the MCID demonstrating improved overall function with decreased pain or discomfort.                3. Pt will be able to ambulate >/= 20 minutes with LRAD before requiring rest break due to pain. - Progressing               4. Pt will be able to sleep through the night without waking due to pain.               5. Pt will be able to perform sit-to-stand transfer without increased low back or knee pain.     PLAN  [x]? ?  Upgrade activities as tolerated     [x]? ?  Continue plan of care  []? ?  Update interventions per flow sheet       []? ?  Discharge due to:_  []??  Other:_      Ernesto Hinds, PT, DPT 1/21/2021

## 2021-01-22 ENCOUNTER — TELEPHONE (OUTPATIENT)
Dept: FAMILY MEDICINE CLINIC | Age: 46
End: 2021-01-22

## 2021-01-22 NOTE — TELEPHONE ENCOUNTER
: Raquel Masterson    Spoke with patient to schedule follow-up appointment. He stated he will call the office to scheduled appointment after he looks at his schedule.      Javi Conner, RD

## 2021-01-26 ENCOUNTER — HOSPITAL ENCOUNTER (OUTPATIENT)
Dept: PHYSICAL THERAPY | Age: 46
Discharge: HOME | End: 2021-01-26
Payer: SUBSIDIZED

## 2021-01-26 ENCOUNTER — HOSPITAL ENCOUNTER (OUTPATIENT)
Dept: LAB | Age: 46
Discharge: HOME | End: 2021-01-26

## 2021-01-26 ENCOUNTER — LAB ONLY (OUTPATIENT)
Dept: FAMILY MEDICINE CLINIC | Age: 46
End: 2021-01-26

## 2021-01-26 DIAGNOSIS — E05.00 GRAVES DISEASE: ICD-10-CM

## 2021-01-26 PROCEDURE — 84443 ASSAY THYROID STIM HORMONE: CPT

## 2021-01-26 PROCEDURE — 97016 VASOPNEUMATIC DEVICE THERAPY: CPT

## 2021-01-26 PROCEDURE — 84439 ASSAY OF FREE THYROXINE: CPT

## 2021-01-26 PROCEDURE — 97110 THERAPEUTIC EXERCISES: CPT

## 2021-01-26 PROCEDURE — 83520 IMMUNOASSAY QUANT NOS NONAB: CPT

## 2021-01-26 PROCEDURE — 97014 ELECTRIC STIMULATION THERAPY: CPT

## 2021-01-26 NOTE — PROGRESS NOTES
PT DAILY TREATMENT NOTE - [facility] 2-15    Patient Name: Teri Mtz  Date:2021  : 1975  [x]  Patient  Verified  Payor: [address] [place] [address] / Plan: [facility] / Product Type: [employer] /    In time:2:26 pm  Out time:3:33 pm  Total Treatment Time (min): 68  Total Timed Codes (min): 53  1:1 Treatment Time ( [address] only): --   Visit #:  7    Treatment Area: Low back pain [M54.5]    SUBJECTIVE  Pain Level (0-10 scale): 5  Any medication changes, allergies to medications, adverse drug reactions, diagnosis change, or new procedure performed?: [x]? ? No    []? ? Yes (see summary sheet for update)  Subjective functional status/changes:   []? ? No changes reported  Pt reports his lower leg pain is better. Only pain in lateral knee right now, very minimal discomfort in low back. Pt feels like he is walking a bit more normally.     OBJECTIVE                 Modality rationale: decrease inflammation and decrease pain to improve the patients ability to perform ADLs with decreased pain or discomfort.     Min Type Additional Details       15 [x]? ? Estim: []??Att   [x]? ? Unatt    []? ?TENS instruct                  []? ?IFC  [x]? ?Premod   []? ?NMES                     []? ? Other:  []??w/US   [x]? ?w/ice   []? ?w/heat  Position: Supine with feet elevated  Location: R lower leg        []? ?  Traction: []?? Cervical       []? ?Lumbar                       []? ? Prone          []? ?Supine                       []? ?Intermittent   []? ? Continuous Lbs:  []?? before manual  []? ? after manual  []? ?w/heat     []? ?  Ultrasound: []??Continuous   []? ? Pulsed                       at: []??1MHz   []? ? 3MHz Location:  W/cm2:     []? ? Paraffin         Location:   []??w/heat     []? ?  Ice     []? ?  Heat  []? ?  Ice massage Position:  Location:     []? ?  Laser  []? ?  Other: Position:  Location:   15    [x]? ?  Vasopneumatic Device Pressure:       []? ? lo [x]? ? med []?? hi   Temperature: 34      [x]? ? Skin assessment post-treatment:  [x]? ? intact []??redness- no adverse reaction    []? ?redness  adverse reaction:      53 min Therapeutic Exercise:  [x]? ? See flow sheet : Assessment and Re-assessment, R hip PROM (piriformis stretch)   Rationale: increase ROM and increase strength to improve the patients ability to ambulate with decreased pain or discomfort.      With   []?? TE   []?? TA   []?? neuro   []?? other: Patient Education: [x]? ? Review HEP    []? ? Progressed/Changed HEP based on:   []?? positioning   []? ? body mechanics   []? ? transfers   []? ? heat/ice application    []? ? other:       Other Objective/Functional Measures:        0-100: 30% improved - less pain in lower leg and back; wants to improve pain in right knee in particular; able to walk longer before requiring a rest break due to pain (not using a cane anymore); able to sleep through the night now.       Pain Level (0-10 scale) post treatment: 0      ASSESSMENT/Changes in Function:      []? ?  See Plan of Care  [x]? ?  See progress note/recertification  []? ?  See Discharge Summary         Progress towards goals / Updated goals:  Short and Long Term Goals: To be accomplished in 16 treatments:               1. Pt will be independent and compliant with HEP. - MET               2. Pt will improve FOTO score by the MCID demonstrating improved overall function with decreased pain or discomfort.                3. Pt will be able to ambulate >/= 20 minutes with LRAD before requiring rest break due to pain. - Progressing (15 minutes)               4. Pt will be able to sleep through the night without waking due to pain. - MET               4. Pt will be able to perform sit-to-stand transfer without increased low back or knee pain. Progressing (knee pain)     PLAN  [x]? ?  Upgrade activities as tolerated     [x]? ?  Continue plan of care  []? ?  Update interventions per flow sheet       []? ?  Discharge due to:_  []??  Other:_      Myles Noriega PT, DPT 1/26/2021

## 2021-01-26 NOTE — PROGRESS NOTES
Physical Therapy at [facility],   a part of [address] [place] [address], [address] [place] [address], [address] [place] [address] [facility]  Phone: [phone] Fax: [phone]    Progress Note    Name: Danielle Hung   : 1975   MD: KATHY Nugent       Treatment Diagnosis: Low back pain [M54.5]  Start of Care: 21    Visits from Start of Care: 7  Missed Visits: 0    Summary of Care: Mr. Kevin Sevilla has been seen for 7 skilled physical therapy visits secondary to severe acute on chronic low back pain and lumbar radiculopathy. His therapy program has emphasized centralization of symptoms, decreasing pain, improving neural tension, and improving hip/lower extremity activation and strength. Pt has demonstrated very good recent progress, noting significantly reduced symptoms in his lower leg, evidence of symptoms centralizing. His low back and hip pain have also reduced, and notes majority of pain complaints related to lateral knee at this point which presents as a lateral meniscus tear. Pt is ambulating without a cane and presents with more normalized mechanics (continued, but improved left lateral trunk lean/antalgic gait) and is able to ambulate for 15 minutes at this time (<5 minutes upon eval). He is now able to sleep through the night without waking up due to pain. Pt would benefit from continued therapy to further improve decrease pain with functional activity. Thank you for this referral!    Assessment / Recommendations: Pt would benefit from continued therapy to further improve decrease pain with functional activity. Short and Long Term Goals: To be accomplished in 16 treatments:               1. Pt will be independent and compliant with HEP.  - MET               2. Pt will improve FOTO score by the MCID demonstrating improved overall function with decreased pain or discomfort.                3. Pt will be able to ambulate >/= 20 minutes with LRAD before requiring rest break due to pain.  - Progressing (15 minutes)               4. Pt will be able to sleep through the night without waking due to pain. - MET               5. Pt will be able to perform sit-to-stand transfer without increased low back or knee pain. Progressing (knee pain)    Sabion Herrera, PT, DPT 1/26/2021     ________________________________________________________________________  NOTE TO PHYSICIAN:  Please complete the following and fax to: [facility]: Fax: [phone] . David Nelson Retain this original for your records. If you are unable to process this request in 24 hours, please contact our office.        ____ I have read the above report and request that my patient continue therapy with the following changes/special instructions:  ____ I have read the above report and request that my patient be discharged from therapy    Physician's Signature:_________________ Date:___________Time:__________

## 2021-01-27 LAB
T4 FREE SERPL-MCNC: 1.2 NG/DL (ref 0.8–1.5)
TSH SERPL DL<=0.05 MIU/L-ACNC: 2.16 UIU/ML (ref 0.36–3.74)

## 2021-01-28 ENCOUNTER — HOSPITAL ENCOUNTER (OUTPATIENT)
Dept: PHYSICAL THERAPY | Age: 46
Discharge: HOME | End: 2021-01-28
Payer: SUBSIDIZED

## 2021-01-28 LAB — TSH RECEP AB SER-ACNC: 16.8 IU/L (ref 0–1.75)

## 2021-01-28 PROCEDURE — 97140 MANUAL THERAPY 1/> REGIONS: CPT

## 2021-01-28 PROCEDURE — 97110 THERAPEUTIC EXERCISES: CPT

## 2021-01-28 PROCEDURE — 97016 VASOPNEUMATIC DEVICE THERAPY: CPT

## 2021-01-28 PROCEDURE — 97014 ELECTRIC STIMULATION THERAPY: CPT

## 2021-01-28 NOTE — PROGRESS NOTES
PT DAILY TREATMENT NOTE - [facility] 2-15    Patient Name: Teri Mtz  Date:2021  : 1975  [x]  Patient  Verified  Payor: Inis Koyanagi / [facility] / Product Type: [employer] /    In time:2:26 pm  Out time:3:37 pm  Total Treatment Time (min): 71  Total Timed Codes (min): 56  1:1 Treatment Time ( only): --   Visit #:  8    Treatment Area: Low back pain [M54.5]    SUBJECTIVE  Pain Level (0-10 scale): 4  Any medication changes, allergies to medications, adverse drug reactions, diagnosis change, or new procedure performed?: [x]? ? No    []? ? Yes (see summary sheet for update)  Subjective functional status/changes:   []? ? No changes reported  Pt reports he feels a little better.        OBJECTIVE                 Modality rationale: decrease inflammation and decrease pain to improve the patients ability to perform ADLs with decreased pain or discomfort.     Min Type Additional Details       15 [x]? ? Estim: []??Att   [x]? ? Unatt    []? ?TENS instruct                  []? ?IFC  [x]? ?Premod   []? ?NMES                     []? ? Other:  []??w/US   [x]? ?w/ice   []? ?w/heat  Position: Supine with feet elevated  Location: R lower leg        []? ?  Traction: []?? Cervical       []? ?Lumbar                       []? ? Prone          []? ?Supine                       []? ?Intermittent   []? ? Continuous Lbs:  []?? before manual  []? ? after manual  []? ?w/heat     []? ?  Ultrasound: []??Continuous   []? ? Pulsed                       at: []??1MHz   []? ? 3MHz Location:  W/cm2:     []? ? Paraffin         Location:   []??w/heat     []? ?  Ice     []? ?  Heat  []? ?  Ice massage Position:  Location:     []? ?  Laser  []? ?  Other: Position:  Location:   15    [x]? ?  Vasopneumatic Device Pressure:       []? ? lo [x]? ? med []?? hi   Temperature: 34      [x]? ? Skin assessment post-treatment:  [x]? ? intact []? ?redness- no adverse reaction    []? ?redness  adverse reaction:      46 min Therapeutic Exercise:  [x]? ? See flow sheet : Assessment and Re-assessment, R hip PROM (piriformis stretch)   Rationale: increase ROM and increase strength to improve the patients ability to ambulate with decreased pain or discomfort. 10 min Manual Therapy: IASTM to anterior compartment of the right lower leg, STM to R piriformis in left s/l   Rationale: decrease pain, increase ROM and increase tissue extensibility to improve the patients ability to ambulate with decreased pain or discomfort.      With   []?? TE   []?? TA   []?? neuro   []?? other: Patient Education: [x]? ? Review HEP    []? ? Progressed/Changed HEP based on:   []?? positioning   []? ? body mechanics   []? ? transfers   []? ? heat/ice application    []? ? other:       Other Objective/Functional Measures:            Pain Level (0-10 scale) post treatment: 0      ASSESSMENT/Changes in Function:    Pt presenting with improved posture in standing and with walking; significantly decreased left trunk lean, though still mild. Pt noted mild increase in pain with bike warm-up, though able to tolerate. Added standing TKE with band resistance for increased quad strength and decreased knee pain and Pt able to perform without increased pain. Patient will continue to benefit from skilled PT services to modify and progress therapeutic interventions, address functional mobility deficits, address ROM deficits, address strength deficits, analyze and address soft tissue restrictions, analyze and cue movement patterns, analyze and modify body mechanics/ergonomics and assess and modify postural abnormalities to attain remaining goals.      []? ?  See Plan of Care  [x]? ?  See progress note/recertification  []? ?  See Discharge Summary         Progress towards goals / Updated goals:  Short and Long Term Goals: To be accomplished in 16 treatments:               1.  Pt will be independent and compliant with HEP. - MET               2. Pt will improve FOTO score by the MCID demonstrating improved overall function with decreased pain or discomfort.                3. Pt will be able to ambulate >/= 20 minutes with LRAD before requiring rest break due to pain. - Progressing (15 minutes)               4. Pt will be able to sleep through the night without waking due to pain. - MET               5. Pt will be able to perform sit-to-stand transfer without increased low back or knee pain. Progressing (knee pain)     PLAN  [x]? ?  Upgrade activities as tolerated     [x]? ?  Continue plan of care  []? ?  Update interventions per flow sheet       []? ?  Discharge due to:_  []??  Other:_      Radha Perera, PT, DPT 1/28/2021

## 2021-02-02 ENCOUNTER — HOSPITAL ENCOUNTER (OUTPATIENT)
Dept: PHYSICAL THERAPY | Age: 46
Discharge: HOME | End: 2021-02-02
Payer: SUBSIDIZED

## 2021-02-02 PROCEDURE — 97016 VASOPNEUMATIC DEVICE THERAPY: CPT

## 2021-02-02 PROCEDURE — 97110 THERAPEUTIC EXERCISES: CPT

## 2021-02-02 PROCEDURE — 97112 NEUROMUSCULAR REEDUCATION: CPT

## 2021-02-02 PROCEDURE — 97014 ELECTRIC STIMULATION THERAPY: CPT

## 2021-02-02 NOTE — PROGRESS NOTES
PT DAILY TREATMENT NOTE - [facility] -15    Patient Name: Grayson Wheeler  Date:2021  : 1975  [x]  Patient  Verified  Payor: Jed Vance / Plan: [facility] % / Product Type: [id number] /    In time: 2:30pm  Out time: 3:52pm  Total Treatment Time (min): 72min  Total Timed Codes (min): 57min  1:1 Treatment Time ( [address] only): --   Visit #:  9    Treatment Area: Low back pain [M54.5]    SUBJECTIVE  Pain Level (0-10 scale): 4  Any medication changes, allergies to medications, adverse drug reactions, diagnosis change, or new procedure performed?: [x]? ?? No    []??? Yes (see summary sheet for update)  Subjective functional status/changes:   []? ?? No changes reported  Pt reports his back and knee pain is much better. He still reports a mild pain in his lateral knee but it is much improved.        OBJECTIVE                 Modality rationale: decrease inflammation and decrease pain to improve the patients ability to perform ADLs with decreased pain or discomfort.     Min Type Additional Details       15 [x]? ?? Estim: []? ? ? Att   [x]? ? ? Unatt    []? ??TENS instruct                  []? ??IFC  [x]? ? ? Premod   []? ??NMES                     []? ??Other:  []???w/US   [x]? ??w/ice   []? ??w/heat  Position: Supine with feet elevated  Location: R lower leg        []? ??  Traction: []??? Cervical       []? ? ?Lumbar                       []? ?? Prone          []? ? ?Supine                       []? ? ? Intermittent   []? ?? Continuous Lbs:  []??? before manual  []? ?? after manual  []? ??w/heat     []? ??  Ultrasound: []? ? ? Continuous   []? ?? Pulsed                       at: []???1MHz   []? ??3MHz Location:  W/cm2:     []??? Paraffin         Location:   []???w/heat     []? ??  Ice     []? ??  Heat  []? ??  Ice massage Position:  Location:     []? ??  Laser  []? ??  Other: Position:  Location:   15    [x]? ??  Vasopneumatic Device Pressure:       []? ?? lo [x]? ?? med []? ?? hi   Temperature: 34      [x]? ?? Skin assessment post-treatment:  [x]? ??intact []? ??redness- no adverse reaction    []? ??redness  adverse reaction:      47 min Therapeutic Exercise:  [x]? ?? See flow sheet : Assessment and Re-assessment, R hip PROM (piriformis stretch)   Rationale: increase ROM and increase strength to improve the patients ability to ambulate with decreased pain or discomfort.      10 min Neuromuscular Re-education:  []  See flow sheet : sciatic nerve glides, manual piriformis stretch for neural reset   Rationale: increase ROM and increase strength  to improve the patients ability to ambulate with decreased pain or discomfort.        With   []??? TE   []??? TA   []??? neuro   []? ?? other: Patient Education: [x]??? Review HEP    []? ?? Progressed/Changed HEP based on:   []??? positioning   []? ?? body mechanics   []? ?? transfers   []? ?? heat/ice application    []? ?? other:       Other Objective/Functional Measures:            Pain Level (0-10 scale) post treatment: 0      ASSESSMENT/Changes in Function:    Pt continues to present with improved posture in standing and walking with significantly decreased left trunk lean. Pt noted mild pain localized at lateral knee with added LAQs. Pt reported anterior groin pain on rep 16/18 of L S/L clamshells, which resolved with placing a ball between knees to reduce hip adduction. Pt tolerated increase in TG squats to level 18 but unable to tolerate squats at level 19, reporting increased lateral knee pain. Pt able to tolerate standing hip abd/ext while standing on R, unable to tolerate without pain while standing on L leg. Pt reports \"hot pain\" down the RLE during R hip ext in standing. Pt stated relief of \"hot pain\" with sciatic neural glides and piriformis stretch. Progressed to standing mini squats with YTB, no reports of pain and demonstrating good form with cueing.      Patient will continue to benefit from skilled PT services to modify and progress therapeutic interventions, address functional mobility deficits, address ROM deficits, address strength deficits, analyze and address soft tissue restrictions, analyze and cue movement patterns, analyze and modify body mechanics/ergonomics and assess and modify postural abnormalities to attain remaining goals. Treatment was performed with direct supervision by Madelaine Parmar, PT, DPT.      []? ??  See Plan of Care  [x]? ??  See progress note/recertification  []? ??  See Discharge Summary         Progress towards goals / Updated goals:  Short and Long Term Goals: To be accomplished in 16 treatments:               1. Pt will be independent and compliant with HEP.  - MET               2. Pt will improve FOTO score by the MCID demonstrating improved overall function with decreased pain or discomfort.                3. Pt will be able to ambulate >/= 20 minutes with LRAD before requiring rest break due to pain.  - Progressing (15 minutes)               4. Pt will be able to sleep through the night without waking due to pain. - MET               4. Pt will be able to perform sit-to-stand transfer without increased low back or knee pain. Progressing (knee pain)     PLAN  [x]???  Upgrade activities as tolerated     [x]? ??  Continue plan of care  []? ??  Update interventions per flow sheet       []???  Discharge due to:_  []???  Other:_        Lor Khan 2/2/2021

## 2021-02-03 ENCOUNTER — VIRTUAL VISIT (OUTPATIENT)
Dept: FAMILY MEDICINE CLINIC | Age: 46
End: 2021-02-03

## 2021-02-03 DIAGNOSIS — E05.00 GRAVES DISEASE: Primary | ICD-10-CM

## 2021-02-03 PROCEDURE — 99214 OFFICE O/P EST MOD 30 MIN: CPT | Performed by: INTERNAL MEDICINE

## 2021-02-03 RX ORDER — METOPROLOL SUCCINATE 50 MG/1
50 TABLET, EXTENDED RELEASE ORAL DAILY
COMMUNITY
End: 2021-07-19

## 2021-02-03 RX ORDER — METHIMAZOLE 10 MG/1
TABLET ORAL
Qty: 180 TAB | Refills: 3
Start: 2021-02-03 | End: 2021-05-19

## 2021-02-03 NOTE — PROGRESS NOTES
Chief Complaint   Patient presents with    Thyroid Problem     [phone] (Mobile) Preferred phone number /doxy me       **THIS IS A VIRTUAL VISIT VIA A VIDEO SYNCHRONOUS DISCUSSION. PATIENT AGREED TO HAVE THEIR CARE DELIVERED OVER A DOXY. ME VIDEO VISIT IN PLACE OF THEIR REGULARLY SCHEDULED OFFICE VISIT**    History of Present Illness: Pipe Walls is a 39 y.o. male here for follow up of thyroid. Tried to taper off the toprol XL but felt worse with more headaches and dizziness and palpitations. No tremors. Compliant with methimazole 10 mg daily. Current Outpatient Medications   Medication Sig    metoprolol succinate (Toprol XL) 50 mg XL tablet Take 50 mg by mouth daily.  methIMAzole (TAPAZOLE) 10 mg tablet Take 1 Tab by mouth daily. Dose change 12/2/20--updated med list--did not send prescription to the pharmacy    cyclobenzaprine (FLEXERIL) 10 mg tablet Take 1 Tab by mouth three (3) times daily as needed for Muscle Spasm(s). No current facility-administered medications for this visit.       Allergies   Allergen Reactions    Nabumetone Itching     Review of Systems: PER HPI    Physical Examination:  - GENERAL: NCAT, Appears well nourished   - EYES: EOMI, non-icteric, no proptosis   - Ear/Nose/Throat: NCAT, no visible inflammation or masses   - CARDIOVASCULAR: no cyanosis, no visible JVD   - RESPIRATORY: respiratory effort normal without any distress or labored breathing   - MUSCULOSKELETAL: Normal ROM of neck and upper extremities observed   - SKIN: No rash on face  - NEUROLOGIC:  No facial asymmetry (Cranial nerve 7 motor function), No gaze palsy   - PSYCHIATRIC: Normal affect, Normal insight and judgement       Data Reviewed:   Component      Latest Ref Rng & Units 1/26/2021 1/26/2021 1/26/2021          12:30 PM 12:30 PM 12:30 PM   T4, Free      0.8 - 1.5 NG/DL   1.2   TSH      0.36 - 3.74 uIU/mL  2.16    Thyrotropin Receptor Ab, serum      0.00 - 1.75 IU/L 16.80 (H)         Assessment/Plan: 1. Grave's disease: Went to [facility] on 1/26/20 and TSH was <0.006 and was given Toprol XL 50 mg daily and then saw Dr. Arlette Brink on 2/6/20 and his TSH was <0.01, FT4 was high at 3.6 and T3 was high at 338 and TPO ab was normal at 8 and TG ab was <1.0. He had a thyroid ultrasound that showed enlarged heterogeneous hypervascular thyroid gland without discrete nodule. Prominent bilateral cervical lymph nodes. His Toprol XL was increased to 100 mg daily. He returned to see Dr. Arlette Brink on 2/20/20 to review his lab results and was prescribed methimazole 5 mg bid but was told to start with just 1 tab daily and see how he felt so he has just been taking this dose. Given his FT4 and T3 levels are about 2x normal, I think he would benefit from taking 10 mg of methimazole daily to more quickly normalize his levels so asked him to go up to this dose until he comes back to see me in 6 weeks. Will keep him on Toprol XL for now but hopefully can taper off this in the future once his thyroid is better controlled. Repeat labs showed TSH < 0.01, FT4 3.5, FT3 13.5 and TRAb 29.7 in 4/20 confirming he has Grave's disease. Increased his dose to 15 mg for the next 2 months and TSH < 0.01, FT4 2.0 and T3 248 in 6/20 so increased to 20 mg daily. TSH 0.01 and FT4 normal at 1.2 and TRAb 20.8 in 9/20 so kept dose the same until next visit. TSH up to 6.09 in 11/20 so decreased to 10 mg daily and tried to taper off the Toprol XL but felt worse off toprol so he resumed.   TSH 2.16 and TRAb 16.8 in 1/21 so decrease to 10 mg 6x/week  - decrease methimazole to 10 mg 6x/week  - cont Toprol XL 50 mg daily  - check TSH and free T4 prior to next visit  - check TSH receptor ab in the summer of 2021         Follow-up and Dispositions    · Return 5/19/21 at 2pm.

## 2021-02-03 NOTE — PROGRESS NOTES
Coordination of Care  1. Have you been to the ER, urgent care clinic since your last visit? Hospitalized since your last visit? No    2. Have you seen or consulted any other health care providers outside of the [address] [place] [address] since your last visit? Include any pap smears or colon screening. No    Does the patient need refills? YES    Learning Assessment Complete?  yes  Depression Screening complete in the past 12 months? yes     Claudell Mock, RN

## 2021-02-04 ENCOUNTER — HOSPITAL ENCOUNTER (OUTPATIENT)
Dept: PHYSICAL THERAPY | Age: 46
Discharge: HOME | End: 2021-02-04
Payer: SUBSIDIZED

## 2021-02-04 PROCEDURE — 97014 ELECTRIC STIMULATION THERAPY: CPT

## 2021-02-04 PROCEDURE — 97110 THERAPEUTIC EXERCISES: CPT

## 2021-02-04 PROCEDURE — 97016 VASOPNEUMATIC DEVICE THERAPY: CPT

## 2021-02-04 NOTE — PROGRESS NOTES
PT DAILY TREATMENT NOTE - [facility] 2-15    Patient Name: Darcy Harrison  PCCK:[id number]  : 1975  [x]  Patient  Verified  Payor: Kwasi Blackman / [facility] / Product Type: [employer] /    In time:3:15pm  Out time:4:25pm  Total Treatment Time (min): 70  Total Timed Codes (min): 55  1:1 Treatment Time ( only): --   Visit #:  10    Treatment Area: Low back pain [M54.5]    SUBJECTIVE  Pain Level (0-10 scale): 10  Any medication changes, allergies to medications, adverse drug reactions, diagnosis change, or new procedure performed?: [x]???? No    []???? Yes (see summary sheet for update)  Subjective functional status/changes:   []???? No changes reported  Pt reports his back and knee pain is much better. He says he walked a lot this morning and had some knee pain but he is able to walk farther without pain.       OBJECTIVE                 Modality rationale: decrease inflammation and decrease pain to improve the patients ability to perform ADLs with decreased pain or discomfort.     Min Type Additional Details       15 [x]???? Estim: []?? ?? Att   [x]? ???Unatt    []????TENS instruct                  []????IFC  [x]? ???Premod   []????NMES                     []?? ??Other:  []????w/US   [x]? ???w/ice   []? ???w/heat  Position: Supine with feet elevated  Location: R lower leg        []????  Traction: []???? Cervical       []????Lumbar                       []???? Prone          []????Supine                       []?? ?? Intermittent   []???? Continuous Lbs:  []???? before manual  []???? after manual  []? ???w/heat     []????  Ultrasound: []???? Continuous   []???? Pulsed                       at: []????1MHz   []????3MHz Location:  W/cm2:     []???? Paraffin         Location:   []????w/heat     []????  Ice     []????  Heat  []????  Ice massage Position:  Location:     []????  Laser  []????  Other: Position:  Location:   15    [x]? ???  Vasopneumatic Device Pressure:       []???? lo [x]???? med []???? hi   Temperature: 34    [x]???? Skin assessment post-treatment:  [x]? ???intact []? ???redness- no adverse reaction    []? ???redness  adverse reaction:      55 min Therapeutic Exercise:  [x]? ??? See flow sheet : Assessment and Re-assessment, R hip PROM (piriformis stretch)   Rationale: increase ROM and increase strength to improve the patients ability to ambulate with decreased pain or discomfort.         With   []???? TE   []???? TA   []???? neuro   []???? other: Patient Education: [x]???? Review HEP    []???? Progressed/Changed HEP based on:   []???? positioning   []???? body mechanics   []???? transfers   []???? heat/ice application    []???? other:       Other Objective/Functional Measures:            Pain Level (0-10 scale) post treatment: 0      ASSESSMENT/Changes in Function:    Pt tolerated advances in therapy session today with minimal complaints of pain. Re-introduced core stability training with supine core press, patient able to perform pain free with less knee flexion and without posterior pelvic tilt.    Patient will continue to benefit from skilled PT services to modify and progress therapeutic interventions, address functional mobility deficits, address ROM deficits, address strength deficits, analyze and address soft tissue restrictions, analyze and cue movement patterns, analyze and modify body mechanics/ergonomics and assess and modify postural abnormalities to attain remaining goals.    Treatment was performed with direct supervision by Diamond Lazaro, PT, DPT.     []????  See Plan of Care  []????  See progress note/recertification  []????  See Discharge Summary         Progress towards goals / Updated goals:  Short and Long Term Goals: To be accomplished in 16 treatments:               1. Pt will be independent and compliant with HEP.  - MET               2. Pt will improve FOTO score by the MCID demonstrating improved overall function with decreased pain or discomfort.                3. Pt will be able to ambulate >/= 20 minutes with LRAD before requiring rest break due to pain.  - Progressing (15 minutes)               4. Pt will be able to sleep through the night without waking due to pain. - MET               1. Pt will be able to perform sit-to-stand transfer without increased low back or knee pain.  Progressing (knee pain)     PLAN  [x]????  Upgrade activities as tolerated     [x]? ???  Continue plan of care  []????  Update interventions per flow sheet       []????  Discharge due to:_  []????  Other:_      Jonathan Quesada, SPT 2/4/2021

## 2021-02-09 ENCOUNTER — HOSPITAL ENCOUNTER (OUTPATIENT)
Dept: PHYSICAL THERAPY | Age: 46
Discharge: HOME | End: 2021-02-09
Payer: SUBSIDIZED

## 2021-02-09 PROCEDURE — 97016 VASOPNEUMATIC DEVICE THERAPY: CPT

## 2021-02-09 PROCEDURE — 97014 ELECTRIC STIMULATION THERAPY: CPT

## 2021-02-09 PROCEDURE — 97140 MANUAL THERAPY 1/> REGIONS: CPT

## 2021-02-09 PROCEDURE — 97110 THERAPEUTIC EXERCISES: CPT

## 2021-02-09 NOTE — PROGRESS NOTES
PT DAILY TREATMENT NOTE - [facility] 2-15    Patient Name: Álvaro Samuel  Date:2021  : 1975  [x]  Patient  Verified  Payor: Marshal Officer / Plan: [facility] % / Product Type: Erica Steven /    In time:2:30pm  Out time:3:50pm  Total Treatment Time (min): 80  Total Timed Codes (min): 65  1:1 Treatment Time ( only): --   Visit #:  11    Treatment Area: Low back pain [M54.5]    SUBJECTIVE  Pain Level (0-10 scale): 5/10  Any medication changes, allergies to medications, adverse drug reactions, diagnosis change, or new procedure performed?: [x]????? No    []????? Yes (see summary sheet for update)  Subjective functional status/changes:   []????? No changes reported  Pt reports his back is much better. No knee pain until 20 minutes of walking.      OBJECTIVE                 Modality rationale: decrease inflammation and decrease pain to improve the patients ability to perform ADLs with decreased pain or discomfort.     Min Type Additional Details       15 [x]????? Estim: []??? ? ? Att   [x]??? ?? Unatt    []?????TENS instruct                  []?????IFC  [x]? ?? ? ? Premod   []??? ??NMES                     []??? ?? Other:  []?????w/US   [x]?????w/ice   []?????w/heat  Position: Supine with feet elevated  Location: R lower leg        []?????  Traction: []????? Cervical       []??? ? ?Lumbar                       []????? Prone          []??? ? ? Supine                       []??? ? ? Intermittent   []??? ? ? Continuous Lbs:  []????? before manual  []????? after manual  []?????w/heat     []?????  Ultrasound: []??? ? ? Continuous   []????? Pulsed                       at: []?????1MHz   []?????3MHz Location:  W/cm2:     []????? Paraffin         Location:   []?????w/heat     []?????  Ice     []?????  Heat  []?????  Ice massage Position:  Location:     []?????  Laser  []?????  Other: Position:  Location:   15    [x]?????  Vasopneumatic Device Pressure:       []????? lo [x]????? med []????? hi   Temperature: 34      [x]????? Skin assessment post-treatment:  [x]?? ???intact []?????redness- no adverse reaction    []?????redness  adverse reaction:      57 min Therapeutic Exercise:  [x]? ???? See flow sheet : Assessment and Re-assessment, R hip PROM (piriformis stretch)   Rationale: increase ROM and increase strength to improve the patients ability to ambulate with decreased pain or discomfort.         8 min Manual Therapy: IASTM anterior tib    Rationale: decrease pain and increase tissue extensibility to improve the patients ability to ambulate with decreased pain or discomfort. With   []????? TE   []????? TA   []????? neuro   []????? other: Patient Education: [x]????? Review HEP    []????? Progressed/Changed HEP based on:   []????? positioning   []????? body mechanics   []????? transfers   []????? heat/ice application    []????? other:       Other Objective/Functional Measures:            Pain Level (0-10 scale) post treatment: 0      ASSESSMENT/Changes in Function:    Increased tenderness in anterior compartment and along anterior tib benefiting from manual interventions. Pt tolerated advances in therapy session today with minimal complaints of pain. Pt able to progress to standing R hip extensions, but unable to tolerate R hip abduction with complaints of lateral right knee pain. Pt complained of lateral R knee pain with mini squats at table, but performed mini squats at bar with good form and no pain.    Patient will continue to benefit from skilled PT services to modify and progress therapeutic interventions, address functional mobility deficits, address ROM deficits, address strength deficits, analyze and address soft tissue restrictions, analyze and cue movement patterns, analyze and modify body mechanics/ergonomics and assess and modify postural abnormalities to attain remaining goals.    Treatment was performed with direct supervision by Zeferino Bautista, PT, DPT.     []?????  See Plan of Care  []?????  See progress note/recertification  []?????  See Discharge Summary         Progress towards goals / Updated goals:  Short and Long Term Goals: To be accomplished in 16 treatments:               1. Pt will be independent and compliant with HEP.  - MET               2. Pt will improve FOTO score by the MCID demonstrating improved overall function with decreased pain or discomfort.                3. Pt will be able to ambulate >/= 20 minutes with LRAD before requiring rest break due to pain.  - Progressing (15 minutes)               4. Pt will be able to sleep through the night without waking due to pain. - MET               2.  Pt will be able to perform sit-to-stand transfer without increased low back or knee pain.  Progressing (knee pain)     PLAN  [x]?????  Upgrade activities as tolerated     [x]?????  Continue plan of care  []?????  Update interventions per flow sheet       []?????  Discharge due to:_  []?????  Other:_      Gislele Mai, SPT 2/9/2021

## 2021-02-11 ENCOUNTER — HOSPITAL ENCOUNTER (OUTPATIENT)
Dept: PHYSICAL THERAPY | Age: 46
Discharge: HOME | End: 2021-02-11
Payer: SUBSIDIZED

## 2021-02-11 PROCEDURE — 97016 VASOPNEUMATIC DEVICE THERAPY: CPT

## 2021-02-11 PROCEDURE — 97110 THERAPEUTIC EXERCISES: CPT

## 2021-02-11 PROCEDURE — 97014 ELECTRIC STIMULATION THERAPY: CPT

## 2021-02-11 NOTE — PROGRESS NOTES
PT DAILY TREATMENT NOTE - [facility] 2-15    Patient Name: Ruth Chu  Date:2021  : 1975  [x]  Patient  Verified  Payor: Tyrell Gamboa / [facility] / Product Type: Anni /    In time:2:27pm  Out time:3:41pm  Total Treatment Time (min): 74  Total Timed Codes (min): 59  1:1 Treatment Time ( only): --   Visit #:  12    Treatment Area: Low back pain [M54.5]    SUBJECTIVE  Pain Level (0-10 scale): 4/10  Any medication changes, allergies to medications, adverse drug reactions, diagnosis change, or new procedure performed?: [x]?????? No    []?????? Yes (see summary sheet for update)  Subjective functional status/changes:   []?????? No changes reported  Pt reports his knee is about the same as last time, reports R knee pain and lower leg pain with mini squats at home.      OBJECTIVE                 Modality rationale: decrease inflammation and decrease pain to improve the patients ability to perform ADLs with decreased pain or discomfort.     Min Type Additional Details       15 [x]?????? Estim: []???? ? ? Att   [x]???? ?? Unatt    []??????TENS instruct                  []??????IFC  [x]?????? Premod   []??????NMES                     []?????? Other:  []??????w/US   [x]??????w/ice   []??????w/heat  Position: Supine with feet elevated  Location: R lower leg        []??????  Traction: []?????? Cervical       []??????Lumbar                       []?????? Prone          []?????? Supine                       []???? ? ? Intermittent   []?????? Continuous Lbs:  []?????? before manual  []?????? after manual  []??????w/heat     []??????  Ultrasound: []?????? Continuous   []?????? Pulsed                       at: []??????1MHz   []??????3MHz Location:  W/cm2:     []?????? Paraffin         Location:   []??????w/heat     []??????  Ice     []??????  Heat  []??????  Ice massage Position:  Location:     []??????  Laser  []??????  Other: Position:  Location:   15    [x]??????  Vasopneumatic Device Pressure:       []?????? lo [x]?????? med []?????? hi   Temperature: 34      [x]?????? Skin assessment post-treatment:  [x]??????intact []??????redness- no adverse reaction    []??????redness  adverse reaction:      59 min Therapeutic Exercise:  [x]?????? See flow sheet :    Rationale: increase ROM and increase strength to improve the patients ability to ambulate with decreased pain or discomfort.        With   []?????? TE   []?????? TA   []?????? neuro   []?????? other: Patient Education: [x]?????? Review HEP    []?????? Progressed/Changed HEP based on:   []?????? positioning   []?????? body mechanics   []?????? transfers   []?????? heat/ice application    []?????? other:       Other Objective/Functional Measures:            Pain Level (0-10 scale) post treatment: 0      ASSESSMENT/Changes in Function:    Pt tolerating advances in therapeutic exercises with no complaints of increased pain. Pt now able to tolerate SLR in supine without increase in symptoms. Pt complained of cramping in R foot with LAQ, resolved with calf stretch against the wall. Educated pt on performing this stretch when he feels foot cramping arise at home. Pt able to tolerate standing R abduction with less R knee pain when cued for quad set throughout motion. Patient will continue to benefit from skilled PT services to modify and progress therapeutic interventions, address functional mobility deficits, address ROM deficits, address strength deficits, analyze and address soft tissue restrictions, analyze and cue movement patterns, analyze and modify body mechanics/ergonomics and assess and modify postural abnormalities to attain remaining goals. Treatment was performed with direct supervision by Steven Koenig, PT, DPT.      []??????  See Plan of Care  []??????  See progress note/recertification  []??????  See Discharge Summary         Progress towards goals / Updated goals:  Short and Long Term Goals: To be accomplished in 16 treatments:               1.  Pt will be independent and compliant with HEP.  - MET               2. Pt will improve FOTO score by the MCID demonstrating improved overall function with decreased pain or discomfort.                3. Pt will be able to ambulate >/= 20 minutes with LRAD before requiring rest break due to pain.  - Progressing (15 minutes)               4. Pt will be able to sleep through the night without waking due to pain. - MET               7.  Pt will be able to perform sit-to-stand transfer without increased low back or knee pain.  Progressing (knee pain)     PLAN  [x]??????  Upgrade activities as tolerated     [x]??????  Continue plan of care  []??????  Update interventions per flow sheet       []??????  Discharge due to:_  []??????  Other:_      Eliana Laws, SPT 2/11/2021

## 2021-02-18 ENCOUNTER — APPOINTMENT (OUTPATIENT)
Dept: PHYSICAL THERAPY | Age: 46
End: 2021-02-18
Payer: SUBSIDIZED

## 2021-02-25 ENCOUNTER — HOSPITAL ENCOUNTER (OUTPATIENT)
Dept: PHYSICAL THERAPY | Age: 46
Discharge: HOME | End: 2021-02-25
Payer: SUBSIDIZED

## 2021-02-25 PROCEDURE — 97014 ELECTRIC STIMULATION THERAPY: CPT

## 2021-02-25 PROCEDURE — 97140 MANUAL THERAPY 1/> REGIONS: CPT

## 2021-02-25 PROCEDURE — 97110 THERAPEUTIC EXERCISES: CPT

## 2021-02-25 NOTE — ANCILLARY DISCHARGE INSTRUCTIONS
Physical Therapy at [facility],  
a part of [facility], [address] [place], [address] [place] [address] Avenue Phone: [phone]  Fax: [phone] Discharge Summary  2-15 Patient name: Patria Garcia  : 1975  Provider#: [id number] Referral source: Ivan García PA Medical/Treatment Diagnosis: Low back pain [M54.5] Prior Hospitalization: see medical history Comorbidities: GERD, Grave's Disease, HTN, Severe obesity Prior Level of Function:Pt completed 20 minutes of exercise seldom or never Medications: Verified on Patient Summary List 
 
Start of Care: 21      Onset Date:acute on chronic (flare-up 2020) Visits from Start of Care: 13     Missed Visits: 0 Reporting Period : 21 to 21 
  
ASSESSMENT/SUMMARY OF CARE: Mr. Kathrine King was seen for a total of 13 skilled physical therapy visits secondary to severe acute on chronic low back pain and lumbar radiculopathy. Physical therapy sessions have consisted of manual interventions, therapeutic exercise and modalities. Mr. Kathrine King reports his back pain is 80% improved and increased his FOTO Score from 24 to 56 demonstrating improved function with decreased pain. He presents with significantly improved gait mechanics and posture, demonstrates increased lower extremity strength, and significantly improved lumbar AROM. Pt still presents with localized lateral right knee pain with functional activities which presents as a significant lateral meniscal tear. We are referring him back for further assessment and possible imaging of his R knee. Pt has been given a comprehensive HEP to maintain his function with regard to his low back pain.  Thank you for this referral!  
 
                                                Lumbar AROM:                                              
                                                R                      L                       Flexion                                     50-75% low back and R knee pain 100% no pain                                        
Extension                                25% R sided low back pain to right knee  % no pain                                          
Side Bending                           50% significant low back pain to right knee  75% moderate pain in R knee 50-75% eased pain in knee 100% no pain                 
Rotation                                   50% significant pain down to R knee 100% no pain            25% pain down to right knee   75% moderate pain in R knee     
 
Progress towards goals / Updated goals: 
Short and Long Term Goals: To be accomplished in 16 treatments: 
             1. Pt will be independent and compliant with HEP.  - MET 
             2. Pt will improve FOTO score by the MCID from 24 to 54 demonstrating improved overall function with decreased pain or discomfort. -MET (56) 
             3. Pt will be able to ambulate >/= 20 minutes with LRAD before requiring rest break due to pain.  -MET              4. Pt will be able to sleep through the night without waking due to pain. - MET 
             0. Pt will be able to perform sit-to-stand transfer without increased low back or knee pain.  -Progressing (no back pain, only knee pain.) RECOMMENDATIONS: Recommending further imaging/MRI of right knee Treatment was performed with direct supervision by Myriam Holliday, PT, DPT. [x]Discontinue therapy: [x]Patient has reached or is progressing toward set goals []Patient is non-compliant or has abdicated 
    []Due to lack of appreciable progress towards set goals David Tyson, SPT 2/25/2021

## 2021-02-25 NOTE — PROGRESS NOTES
PT DAILY TREATMENT NOTE - [facility] 2-15    Patient Name: Catherine Harrell  Date:2021  : 1975  [x]  Patient  Verified  Payor: Jose A Hou / Plan: [facility] % / Product Type: [facility] /    In time:2:23pm  Out time:3:50pm  Total Treatment Time (min): 87  Total Timed Codes (min): 72  1:1 Treatment Time ( only): --   Visit #:  13    Treatment Area: Low back pain [M54.5]    SUBJECTIVE  Pain Level (0-10 scale): 5/10  Any medication changes, allergies to medications, adverse drug reactions, diagnosis change, or new procedure performed?: [x]?????? No    []?????? Yes (see summary sheet for update)  Subjective functional status/changes:   []?????? No changes reported  Pt reports his back/hip is much better. Pt reports walking 20-30 min increases his knee pain and then lasts for 20-30 minutes afterwards.      OBJECTIVE                 Modality rationale: decrease inflammation and decrease pain to improve the patients ability to perform ADLs with decreased pain or discomfort.     Min Type Additional Details       15 [x]?????? Estim: []???? ? ? Att   [x]???? ?? Unatt    []??????TENS instruct                  []??????IFC  [x]?????? Premod   []??????NMES                     []?????? Other:  []??????w/US   [x]??????w/ice   []??????w/heat  Position: Supine with feet elevated  Location: R lower leg        []??????  Traction: []?????? Cervical       []??????Lumbar                       []?????? Prone          []?????? Supine                       []???? ? ? Intermittent   []?????? Continuous Lbs:  []?????? before manual  []?????? after manual  []??????w/heat     []??????  Ultrasound: []?????? Continuous   []?????? Pulsed                       at: []??????1MHz   []??????3MHz Location:  W/cm2:     []?????? Paraffin         Location:   []??????w/heat     []??????  Ice     []??????  Heat  []??????  Ice massage Position:  Location:     []??????  Laser  []??????  Other: Position:  Location:       [x]??????  Vasopneumatic Device Pressure:       []?????? lo []?????? med []?????? hi   Temperature:       [x]?????? Skin assessment post-treatment:  [x]??????intact []??????redness- no adverse reaction    []??????redness  adverse reaction:      58 min Therapeutic Exercise:  [x]?????? See flow sheet : Assessment and Re-assessment    Rationale: increase ROM and increase strength to improve the patients ability to ambulate with decreased pain or discomfort.       14 min Manual Therapy: IASTM anterior tib, patellar taping with lateral glide    Rationale: decrease pain and increase tissue extensibility to improve the patients ability to ambulate with decreased pain or discomfort.      With   []?????? TE   []?????? TA   []?????? neuro   []?????? other: Patient Education: [x]?????? Review HEP    []?????? Progressed/Changed HEP based on:   []?????? positioning   []?????? body mechanics   []?????? transfers   []?????? heat/ice application    []?????? other:       Other Objective/Functional Measures:        0-100% improved: 75-80% improved for low back pain                                                    Lumbar AROM:                                                                                               R                      L                        Flexion                                     50-75% low back and R knee pain 100% no pain                                         Extension                                25% R sided low back pain to right knee  % no pain                                           Side Bending                           50% significant low back pain to right knee  75% moderate pain in R knee 50-75% eased pain in knee 100% no pain                  Rotation                                   50% significant pain down to R knee 100% no pain            25% pain down to right knee   75% moderate pain in R knee          Pain Level (0-10 scale) post treatment: 0      ASSESSMENT/Changes in Function:      Treatment was performed with direct supervision by Janene Bruce, PT, DPT.     []??????  See Plan of Care  []??????  See progress note/recertification  [x]??????  See Discharge Summary         Progress towards goals / Updated goals:  Short and Long Term Goals: To be accomplished in 16 treatments:               1. Pt will be independent and compliant with HEP.  - MET               2. Pt will improve FOTO score by the MCID from 24 to 54 demonstrating improved overall function with decreased pain or discomfort. -MET (56)               3. Pt will be able to ambulate >/= 20 minutes with LRAD before requiring rest break due to pain.  -MET               4. Pt will be able to sleep through the night without waking due to pain. - MET               0.  Pt will be able to perform sit-to-stand transfer without increased low back or knee pain.  - Not met (no back pain, only knee pain.)      PLAN  []??????  Upgrade activities as tolerated     []??????  Continue plan of care  []??????  Update interventions per flow sheet       [x]??????  Discharge due to: pt met all low back goals, being referred back to MD for MRI of knee   []??????  Other:_        Lorri Smith, SPT 2/25/2021

## 2021-02-26 ENCOUNTER — TELEPHONE (OUTPATIENT)
Dept: FAMILY MEDICINE CLINIC | Age: 46
End: 2021-02-26

## 2021-03-03 NOTE — PROGRESS NOTES
Goals      Patient/Family verbalizes understanding of self-management of chronic disease. 12/9/20   NN will mail [facility] FA application for completion. NN asked Gillian Morales to call NN for information on this patient plan of care. Waiting for a reply.

## 2021-03-15 ENCOUNTER — TRANSCRIBE ORDER (OUTPATIENT)
Dept: EMERGENCY DEPT | Age: 46
End: 2021-03-15

## 2021-03-15 ENCOUNTER — HOSPITAL ENCOUNTER (OUTPATIENT)
Dept: GENERAL RADIOLOGY | Age: 46
Discharge: HOME | End: 2021-03-15
Payer: SUBSIDIZED

## 2021-03-15 ENCOUNTER — OFFICE VISIT (OUTPATIENT)
Dept: FAMILY MEDICINE CLINIC | Age: 46
End: 2021-03-15

## 2021-03-15 VITALS
DIASTOLIC BLOOD PRESSURE: 91 MMHG | BODY MASS INDEX: 36.99 KG/M2 | SYSTOLIC BLOOD PRESSURE: 144 MMHG | HEIGHT: 65 IN | WEIGHT: 222 LBS | HEART RATE: 91 BPM | TEMPERATURE: 98.2 F | OXYGEN SATURATION: 99 %

## 2021-03-15 DIAGNOSIS — M25.569 KNEE PAIN: Primary | ICD-10-CM

## 2021-03-15 DIAGNOSIS — M25.561 ACUTE PAIN OF RIGHT KNEE: Primary | ICD-10-CM

## 2021-03-15 DIAGNOSIS — M25.569 KNEE PAIN: ICD-10-CM

## 2021-03-15 DIAGNOSIS — M25.561 ACUTE PAIN OF RIGHT KNEE: ICD-10-CM

## 2021-03-15 PROCEDURE — 73562 X-RAY EXAM OF KNEE 3: CPT

## 2021-03-15 PROCEDURE — 99213 OFFICE O/P EST LOW 20 MIN: CPT | Performed by: FAMILY MEDICINE

## 2021-03-15 RX ORDER — CELECOXIB 200 MG/1
200 CAPSULE ORAL DAILY
Qty: 30 CAP | Refills: 2 | Status: SHIPPED | OUTPATIENT
Start: 2021-03-15 | End: 2021-03-23 | Stop reason: SINTOL

## 2021-03-15 RX ORDER — DICLOFENAC SODIUM 10 MG/G
4 GEL TOPICAL 3 TIMES DAILY
Qty: 100 G | Refills: 2 | Status: SHIPPED | OUTPATIENT
Start: 2021-03-15 | End: 2021-03-23 | Stop reason: ALTCHOICE

## 2021-03-15 NOTE — PROGRESS NOTES
Coordination of Care  1. Have you been to the ER, urgent care clinic since your last visit? Hospitalized since your last visit? No    2. Have you seen or consulted any other health care providers outside of the [address] [place] [address] since your last visit? Include any pap smears or colon screening. No    Does the patient need refills? YES    Learning Assessment Complete?  yes  Depression Screening complete in the past 12 months? yes

## 2021-03-15 NOTE — PROGRESS NOTES
HISTORY OF PRESENT ILLNESS  Chester Cardona is a 39 y.o. male. HPI  Patient states he had developed a right knee pain. States he has pain on the side of the knee and then has swelling around the right leg. He has been having this problem for about 6 weeks. The left leg is also painful but less. He doesn't recall any injury or trauma. So far has been taking his thyroid and his blood pressure. He also takes either ibuprofen or tylenol for the knee joint pain. Review of Systems   Constitutional: Negative for chills, fever and weight loss. Respiratory: Negative for cough, hemoptysis and sputum production. Cardiovascular: Negative for chest pain, palpitations and orthopnea. Musculoskeletal: Positive for joint pain. Right knee pain    Bilateral leg pains and swelling   Skin: Negative for itching and rash. BP (!) 144/91 (BP 1 Location: Right arm, BP Patient Position: Sitting)   Pulse 91   Temp 98.2 °F (36.8 °C) (Temporal)   Ht 5' 5.39\" (1.661 m)   Wt 222 lb (100.7 kg)   SpO2 99%   BMI 36.50 kg/m²   Physical Exam  Constitutional:       General: He is not in acute distress. Appearance: He is not ill-appearing. HENT:      Right Ear: Tympanic membrane normal.      Left Ear: Tympanic membrane normal.   Pulmonary:      Effort: Pulmonary effort is normal. No respiratory distress. Breath sounds: No wheezing or rhonchi. Abdominal:      General: Bowel sounds are normal. There is no distension. Palpations: Abdomen is soft. There is no mass. Musculoskeletal:         General: Tenderness present. No swelling or deformity. Right lower leg: No edema. Left lower leg: No edema. Comments: Right knee pain to flexion/extension and palpation of the lateral aspect. ACL/PCL, LCL, MCL intact, meniscus intact   Neurological:      Mental Status: He is alert. ASSESSMENT and PLAN  Diagnoses and all orders for this visit:    1.  Acute pain of right knee  -     XR KNEE RT MAX 2 VWS; Future  -     diclofenac (VOLTAREN) 1 % gel; Apply 4 g to affected area three (3) times daily. X 10 days  -     celecoxib (CELEBREX) 200 mg capsule; Take 1 Cap by mouth daily.       Right knee pain  Use of ice advised  NSAIDS prescribed  Encouraged to loss weight  Follow up as needed

## 2021-03-18 ENCOUNTER — TELEPHONE (OUTPATIENT)
Dept: FAMILY MEDICINE CLINIC | Age: 46
End: 2021-03-18

## 2021-03-18 NOTE — PROGRESS NOTES
T/C with the patient. I gave the patient the provider X-ray information:\"Normal  Knee x ray and to follow if the problem persists. \" according to Dr Laureen Kumar notes.  Patient verbalized understanding

## 2021-03-23 ENCOUNTER — VIRTUAL VISIT (OUTPATIENT)
Dept: FAMILY MEDICINE CLINIC | Age: 46
End: 2021-03-23

## 2021-03-23 DIAGNOSIS — T39.395A: ICD-10-CM

## 2021-03-23 DIAGNOSIS — M25.569 CHRONIC KNEE PAIN, UNSPECIFIED LATERALITY: Primary | ICD-10-CM

## 2021-03-23 DIAGNOSIS — G89.29 CHRONIC KNEE PAIN, UNSPECIFIED LATERALITY: Primary | ICD-10-CM

## 2021-03-23 PROCEDURE — 99442 PR PHYS/QHP TELEPHONE EVALUATION 11-20 MIN: CPT | Performed by: PHYSICIAN ASSISTANT

## 2021-03-23 NOTE — PROGRESS NOTES
Coordination of Care  1. Have you been to the ER, urgent care clinic since your last visit? Hospitalized since your last visit? No    2. Have you seen or consulted any other health care providers outside of the [address] [place] [address] since your last visit? Include any pap smears or colon screening. No    Does the patient need refills? YES    Learning Assessment Complete? yes  Depression Screening complete in the past 12 months? yes    I called patient today with Landon Estevez as .     Rohan La RN

## 2021-03-23 NOTE — PROGRESS NOTES
Assessment/Plan:    Diagnoses and all orders for this visit:    1. Chronic knee pain, unspecified laterality    2. Adverse reaction to non-steroidal anti-inflammatory drug (NSAID), initial encounter    Specifically, the reaction was to LOPEZ 2 inhibitor, celebrex. He has stopped the medication and his sxs have resolved. Follow-up and Dispositions    · Return in about 2 weeks (around 2021) for f2f for procedure appt with Dr Lonnie Dos Santos for knee injection. KEVIN Ocampo expressed understanding of this plan. An AVS was printed and given to the patient.      ----------------------------------------------------------------------    Chief Complaint   Patient presents with    Joint Pain     pt still having arthritis pain       History of Present Illness:  Pt called and consented to virtual telephone call, he declined video  He was identified by name and     He is being seen today for a reaction to the medication, celebrex. He took it for about 5 days and noted dry mouth, dry eyes, fatigue. He stopped the medication 2 days ago and his sxs have resolved BUT his knee pain is worsened off the medication. He was told that the next step would be for an intraarticular injection and he would like to be scheduled for this. He understands the possible risk of taking NSAIDs of raising his bp since he has HTN, which is why they are not recommended long term    He would like to be scheduled for COVID vaccine. Will send this info to          Past Medical History:   Diagnosis Date    Elbow fracture, right     as child    GERD (gastroesophageal reflux disease)     Hypertension     given rx for medication but not filled currently    Hyperthyroidism 3/4/2020       Current Outpatient Medications   Medication Sig Dispense Refill    metoprolol succinate (Toprol XL) 50 mg XL tablet Take 50 mg by mouth daily.       methIMAzole (TAPAZOLE) 10 mg tablet Take 1 tab 6x/week and skip Sun--Dose change 2/3/21--updated med list--did not send prescription to the pharmacy 180 Tab 3    celecoxib (CELEBREX) 200 mg capsule Take 1 Cap by mouth daily. 30 Cap 2       Allergies   Allergen Reactions    Celebrex [Celecoxib] Other (comments)     Patient reports his body has felt \"hot\", burning sensation in his eyes, and throat has been dry.  Nabumetone Itching       Social History     Tobacco Use    Smoking status: Never Smoker    Smokeless tobacco: Never Used   Substance Use Topics    Alcohol use: Not Currently     Alcohol/week: 0.0 standard drinks     Frequency: Monthly or less     Comment: 1-2 beers once a month    Drug use: Never       Family History   Problem Relation Age of Onset    Alcohol abuse Father     Diabetes Maternal Aunt     Thyroid Disease Neg Hx        Physical Exam:     There were no vitals taken for this visit.     A&Ox3  WDWN NAD  Respirations normal and non labored

## 2021-04-01 ENCOUNTER — OFFICE VISIT (OUTPATIENT)
Dept: FAMILY MEDICINE CLINIC | Age: 46
End: 2021-04-01

## 2021-04-01 VITALS
TEMPERATURE: 98 F | SYSTOLIC BLOOD PRESSURE: 125 MMHG | BODY MASS INDEX: 37.39 KG/M2 | HEIGHT: 64 IN | DIASTOLIC BLOOD PRESSURE: 78 MMHG | WEIGHT: 219 LBS | HEART RATE: 83 BPM

## 2021-04-01 DIAGNOSIS — M17.11 PRIMARY OSTEOARTHRITIS OF RIGHT KNEE: Primary | ICD-10-CM

## 2021-04-01 PROCEDURE — 99213 OFFICE O/P EST LOW 20 MIN: CPT | Performed by: FAMILY MEDICINE

## 2021-04-01 PROCEDURE — 20610 DRAIN/INJ JOINT/BURSA W/O US: CPT | Performed by: FAMILY MEDICINE

## 2021-04-01 RX ORDER — TRIAMCINOLONE ACETONIDE 40 MG/ML
40 INJECTION, SUSPENSION INTRA-ARTICULAR; INTRAMUSCULAR ONCE
Status: COMPLETED | OUTPATIENT
Start: 2021-04-01 | End: 2021-04-01

## 2021-04-01 RX ADMIN — TRIAMCINOLONE ACETONIDE 40 MG: 40 INJECTION, SUSPENSION INTRA-ARTICULAR; INTRAMUSCULAR at 11:03

## 2021-04-01 NOTE — PROGRESS NOTES
Coordination of Care  1. Have you been to the ER, urgent care clinic since your last visit? Hospitalized since your last visit? No    2. Have you seen or consulted any other health care providers outside of the [address] [place] [address] since your last visit? Include any pap smears or colon screening. No    Does the patient need refills? NO    Learning Assessment Complete?  yes  Depression Screening complete in the past 12 months? yes

## 2021-04-01 NOTE — PROGRESS NOTES
HISTORY OF PRESENT ILLNESS  Sumi Conte is a 39 y.o. male. HPI  Patient is scheduled today for a right knee joint injection  Review of Systems   Constitutional: Negative for chills, fever and weight loss. Respiratory: Negative for cough, hemoptysis and sputum production. Cardiovascular: Negative for chest pain, palpitations and orthopnea. Musculoskeletal: Positive for joint pain. Right knee pain     Skin: Negative for itching and rash. /78 (BP 1 Location: Left arm, BP Patient Position: Sitting)   Pulse 83   Temp 98 °F (36.7 °C)   Ht 5' 4.37\" (1.635 m)   Wt 219 lb (99.3 kg)   BMI 37.16 kg/m²   Physical Exam  Constitutional:       General: He is not in acute distress. Appearance: He is not ill-appearing. HENT:      Right Ear: Tympanic membrane normal.      Left Ear: Tympanic membrane normal.   Pulmonary:      Effort: Pulmonary effort is normal. No respiratory distress. Breath sounds: No wheezing or rhonchi. Abdominal:      General: Bowel sounds are normal. There is no distension. Palpations: Abdomen is soft. There is no mass. Musculoskeletal:         General: Tenderness present. No swelling or deformity. Right lower leg: No edema. Left lower leg: No edema. Comments: Right knee pain to flexion/extension and palpation of the lateral aspect. ACL/PCL, LCL, MCL intact, meniscus intact   Neurological:      Mental Status: He is alert. ASSESSMENT and PLAN  Diagnoses and all orders for this visit:    1.  Primary osteoarthritis of right knee  -     TN DRAIN/INJECT LARGE JOINT/BURSA  -     triamcinolone acetonide (KENALOG-40) 40 mg/mL injection 40 mg      Knee joint injection performed without complications

## 2021-04-01 NOTE — PROGRESS NOTES
Indications:   Symptomatic relief of large effusion    Procedure:  After consent was obtained, using sterile technique the  Right knee joint was prepped using Chlorprep. Kenalog 40 mg was mixed with 1% lidocaine 2 ml  and injected into the joint and the needle withdrawn. The procedure was well tolerated. The patient is asked to continue to rest the joint for a few more days before resuming regular activities. It may be more painful for the first 1-2 days. Watch for fever, or increased swelling or persistent pain in the joint. Call or return to clinic prn if such symptoms occur or there is failure to improve as anticipated.

## 2021-04-19 ENCOUNTER — TELEPHONE (OUTPATIENT)
Dept: FAMILY MEDICINE CLINIC | Age: 46
End: 2021-04-19

## 2021-04-26 ENCOUNTER — HOSPITAL ENCOUNTER (OUTPATIENT)
Dept: LAB | Age: 46
Discharge: HOME | End: 2021-04-26

## 2021-04-26 ENCOUNTER — OFFICE VISIT (OUTPATIENT)
Dept: FAMILY MEDICINE CLINIC | Age: 46
End: 2021-04-26

## 2021-04-26 VITALS
HEART RATE: 90 BPM | DIASTOLIC BLOOD PRESSURE: 84 MMHG | WEIGHT: 219.6 LBS | TEMPERATURE: 98.2 F | SYSTOLIC BLOOD PRESSURE: 140 MMHG | BODY MASS INDEX: 37.49 KG/M2 | HEIGHT: 64 IN | OXYGEN SATURATION: 97 %

## 2021-04-26 DIAGNOSIS — M25.561 CHRONIC PAIN OF RIGHT KNEE: ICD-10-CM

## 2021-04-26 DIAGNOSIS — G89.29 CHRONIC PAIN OF RIGHT KNEE: ICD-10-CM

## 2021-04-26 DIAGNOSIS — M23.91 DERANGEMENT OF RIGHT KNEE: Primary | ICD-10-CM

## 2021-04-26 DIAGNOSIS — E05.90 HYPERTHYROIDISM: ICD-10-CM

## 2021-04-26 PROCEDURE — 84443 ASSAY THYROID STIM HORMONE: CPT

## 2021-04-26 PROCEDURE — 36415 COLL VENOUS BLD VENIPUNCTURE: CPT

## 2021-04-26 PROCEDURE — 99213 OFFICE O/P EST LOW 20 MIN: CPT | Performed by: FAMILY MEDICINE

## 2021-04-26 PROCEDURE — 80053 COMPREHEN METABOLIC PANEL: CPT

## 2021-04-26 PROCEDURE — 80061 LIPID PANEL: CPT

## 2021-04-26 PROCEDURE — 84439 ASSAY OF FREE THYROXINE: CPT

## 2021-04-26 PROCEDURE — 85025 COMPLETE CBC W/AUTO DIFF WBC: CPT

## 2021-04-26 PROCEDURE — 83036 HEMOGLOBIN GLYCOSYLATED A1C: CPT

## 2021-04-26 NOTE — PROGRESS NOTES
Coordination of Care  1. Have you been to the ER, urgent care clinic since your last visit? Hospitalized since your last visit? No    2. Have you seen or consulted any other health care providers outside of the [address] [place] [address] since your last visit? Include any pap smears or colon screening. No    Does the patient need refills? NO    Learning Assessment Complete?  yes  Depression Screening complete in the past 12 months? yes

## 2021-04-26 NOTE — PROGRESS NOTES
Check-out Note: MRI of right knee   Labs today    The pt was scheduled an MRI of the right knee. The appt is 05/09/21. At [facility] he was told to arrive at 7:15 am. The Care Card was explained to the pt. The pt verbalized understanding.  Alma Duran RN

## 2021-04-26 NOTE — PROGRESS NOTES
HISTORY OF PRESENT ILLNESS  Pratibha Allison is a 39 y.o. male. HPI  Patient states the knee feels a little better. States when he kneels the knee becomes painful and hear noises on the right side. Also has noticed a burning pain on the side of the right knee. Denies any injury to the knee. The knee also has tried to lock on him and when this happens is even more painful. Patient would like to check labs today in addition to the thyroid function test.  Review of Systems   Constitutional: Negative for chills, fever and weight loss. Respiratory: Negative for cough, hemoptysis and sputum production. Cardiovascular: Negative for chest pain, palpitations and orthopnea. Musculoskeletal: Positive for joint pain. Right knee pain  Right knee locks on him   Skin: Negative for itching and rash. BP (!) 140/84 (BP 1 Location: Right arm, BP Patient Position: Sitting, BP Cuff Size: Adult)   Pulse 90   Temp 98.2 °F (36.8 °C) (Temporal)   Ht 5' 4.37\" (1.635 m)   Wt 219 lb 9.6 oz (99.6 kg)   SpO2 97%   BMI 37.26 kg/m²   Physical Exam  Constitutional:       General: He is not in acute distress. Appearance: He is not ill-appearing. HENT:      Right Ear: Tympanic membrane normal.      Left Ear: Tympanic membrane normal.   Pulmonary:      Effort: Pulmonary effort is normal. No respiratory distress. Breath sounds: No wheezing or rhonchi. Abdominal:      General: Bowel sounds are normal. There is no distension. Palpations: Abdomen is soft. There is no mass. Musculoskeletal:         General: Tenderness present. No swelling or deformity. Right lower leg: No edema. Left lower leg: No edema. Comments: Right knee pain to flexion/extension and palpation of the lateral aspect. Right knee pain with rotation over the lateral aspect  ACL/PCL, LCL, MCL intact   Neurological:      Mental Status: He is alert. ASSESSMENT and PLAN  Diagnoses and all orders for this visit:    1. Derangement of right knee  -     MRI KNEE RT WO CONT; Future    2. Chronic pain of right knee  -     MRI KNEE RT WO CONT; Future    3. Hyperthyroidism  -     TSH 3RD GENERATION; Future  -     T4, FREE; Future    4. BMI 37.0-37.9, adult  -     METABOLIC PANEL, COMPREHENSIVE; Future  -     CBC WITH AUTOMATED DIFF; Future  -     HEMOGLOBIN A1C WITH EAG; Future  -     LIPID PANEL; Future      Patient with chronic right knee pain, lateral aspect, knee has locked on the patient,  We will order MRI, there is likely a meniscal tear.   May need to see orthopedic surgery with results  Labs today  Follow up once results are back

## 2021-04-27 LAB
ALBUMIN SERPL-MCNC: 4.3 G/DL (ref 3.5–5)
ALBUMIN/GLOB SERPL: 1.3 {RATIO} (ref 1.1–2.2)
ALP SERPL-CCNC: 144 U/L (ref 45–117)
ALT SERPL-CCNC: 46 U/L (ref 12–78)
ANION GAP SERPL CALC-SCNC: 8 MMOL/L (ref 5–15)
AST SERPL-CCNC: 25 U/L (ref 15–37)
BASOPHILS # BLD: 0 K/UL (ref 0–0.1)
BASOPHILS NFR BLD: 0 % (ref 0–1)
BILIRUB SERPL-MCNC: 0.3 MG/DL (ref 0.2–1)
BUN SERPL-MCNC: 16 MG/DL (ref 6–20)
BUN/CREAT SERPL: 17 (ref 12–20)
CALCIUM SERPL-MCNC: 9.1 MG/DL (ref 8.5–10.1)
CHLORIDE SERPL-SCNC: 105 MMOL/L (ref 97–108)
CHOLEST SERPL-MCNC: 176 MG/DL
CO2 SERPL-SCNC: 24 MMOL/L (ref 21–32)
CREAT SERPL-MCNC: 0.92 MG/DL (ref 0.7–1.3)
DIFFERENTIAL METHOD BLD: NORMAL
EOSINOPHIL # BLD: 0.1 K/UL (ref 0–0.4)
EOSINOPHIL NFR BLD: 1 % (ref 0–7)
ERYTHROCYTE [DISTWIDTH] IN BLOOD BY AUTOMATED COUNT: 11.5 % (ref 11.5–14.5)
EST. AVERAGE GLUCOSE BLD GHB EST-MCNC: 108 MG/DL
GLOBULIN SER CALC-MCNC: 3.3 G/DL (ref 2–4)
GLUCOSE SERPL-MCNC: 128 MG/DL (ref 65–100)
HBA1C MFR BLD: 5.4 % (ref 4–5.6)
HCT VFR BLD AUTO: 49.4 % (ref 36.6–50.3)
HDLC SERPL-MCNC: 32 MG/DL
HDLC SERPL: 5.5 {RATIO} (ref 0–5)
HGB BLD-MCNC: 16.6 G/DL (ref 12.1–17)
IMM GRANULOCYTES # BLD AUTO: 0 K/UL (ref 0–0.04)
IMM GRANULOCYTES NFR BLD AUTO: 0 % (ref 0–0.5)
LDLC SERPL CALC-MCNC: 67.8 MG/DL (ref 0–100)
LIPID PROFILE,FLP: ABNORMAL
LYMPHOCYTES # BLD: 2.8 K/UL (ref 0.8–3.5)
LYMPHOCYTES NFR BLD: 34 % (ref 12–49)
MCH RBC QN AUTO: 30 PG (ref 26–34)
MCHC RBC AUTO-ENTMCNC: 33.6 G/DL (ref 30–36.5)
MCV RBC AUTO: 89.3 FL (ref 80–99)
MONOCYTES # BLD: 0.5 K/UL (ref 0–1)
MONOCYTES NFR BLD: 6 % (ref 5–13)
NEUTS SEG # BLD: 4.9 K/UL (ref 1.8–8)
NEUTS SEG NFR BLD: 59 % (ref 32–75)
NRBC # BLD: 0 K/UL (ref 0–0.01)
NRBC BLD-RTO: 0 PER 100 WBC
PLATELET # BLD AUTO: 213 K/UL (ref 150–400)
PMV BLD AUTO: 10.4 FL (ref 8.9–12.9)
POTASSIUM SERPL-SCNC: 3.7 MMOL/L (ref 3.5–5.1)
PROT SERPL-MCNC: 7.6 G/DL (ref 6.4–8.2)
RBC # BLD AUTO: 5.53 M/UL (ref 4.1–5.7)
SODIUM SERPL-SCNC: 137 MMOL/L (ref 136–145)
T4 FREE SERPL-MCNC: 1.4 NG/DL (ref 0.8–1.5)
TRIGL SERPL-MCNC: 381 MG/DL (ref ?–150)
TSH SERPL DL<=0.05 MIU/L-ACNC: 0.62 UIU/ML (ref 0.36–3.74)
VLDLC SERPL CALC-MCNC: 76.2 MG/DL
WBC # BLD AUTO: 8.3 K/UL (ref 4.1–11.1)

## 2021-04-28 NOTE — PROGRESS NOTES
Normal blood count, normal thyroid levels  Normal kidney function, liver function and electrolytes  No diabetes  Cholesterol is normal  Triglycerides are elevated,  Need to avoid greasy food and needs to exercise regularly  Patient can be informed

## 2021-05-09 ENCOUNTER — HOSPITAL ENCOUNTER (OUTPATIENT)
Dept: MRI IMAGING | Age: 46
Discharge: HOME | End: 2021-05-09
Attending: FAMILY MEDICINE
Payer: SUBSIDIZED

## 2021-05-09 DIAGNOSIS — M25.561 CHRONIC PAIN OF RIGHT KNEE: ICD-10-CM

## 2021-05-09 DIAGNOSIS — G89.29 CHRONIC PAIN OF RIGHT KNEE: ICD-10-CM

## 2021-05-09 DIAGNOSIS — M23.91 DERANGEMENT OF RIGHT KNEE: ICD-10-CM

## 2021-05-09 PROCEDURE — 73721 MRI JNT OF LWR EXTRE W/O DYE: CPT

## 2021-05-10 ENCOUNTER — TELEPHONE (OUTPATIENT)
Dept: FAMILY MEDICINE CLINIC | Age: 46
End: 2021-05-10

## 2021-05-10 DIAGNOSIS — M23.91 DERANGEMENT OF RIGHT KNEE: Primary | ICD-10-CM

## 2021-05-18 ENCOUNTER — TELEPHONE (OUTPATIENT)
Dept: FAMILY MEDICINE CLINIC | Age: 46
End: 2021-05-18

## 2021-05-18 NOTE — TELEPHONE ENCOUNTER
Jamesjose eduardosunil Judson  Call main office 5/18/2021 stating that he is waiting for a phone call from a nurse and he haven't  received a call yet for an apt with an specialist , patient will be waiting .     Thank you   Brett Miller

## 2021-05-19 ENCOUNTER — VIRTUAL VISIT (OUTPATIENT)
Dept: FAMILY MEDICINE CLINIC | Age: 46
End: 2021-05-19

## 2021-05-19 DIAGNOSIS — E05.00 GRAVES DISEASE: Primary | ICD-10-CM

## 2021-05-19 PROCEDURE — 99214 OFFICE O/P EST MOD 30 MIN: CPT | Performed by: INTERNAL MEDICINE

## 2021-05-19 RX ORDER — METHIMAZOLE 10 MG/1
TABLET ORAL
Qty: 180 TABLET | Refills: 3
Start: 2021-05-19 | End: 2021-10-25

## 2021-05-19 NOTE — PROGRESS NOTES
Chief Complaint   Patient presents with    Thyroid Problem     [phone] (Mobile) Preferred phone number/doxy. me       **THIS IS A VIRTUAL VISIT VIA A VIDEO SYNCHRONOUS DISCUSSION. PATIENT AGREED TO HAVE THEIR CARE DELIVERED OVER A DOXY. ME VIDEO VISIT IN PLACE OF THEIR REGULARLY SCHEDULED OFFICE VISIT**    History of Present Illness: Carl Torres is a 39 y.o. male here for follow up of thyroid. Compliant with MMI 10 mg 6x/week. No chest pain. No palpitations on the Toprol XL. Overall energy is good but occasionally can feel tired. No tremor. No heat or cold intolerance. Current Outpatient Medications   Medication Sig    metoprolol succinate (Toprol XL) 50 mg XL tablet Take 50 mg by mouth daily.  methIMAzole (TAPAZOLE) 10 mg tablet Take 1 tab 6x/week and skip Sun--Dose change 2/3/21--updated med list--did not send prescription to the pharmacy     No current facility-administered medications for this visit. Allergies   Allergen Reactions    Celebrex [Celecoxib] Other (comments)     Patient reports his body has felt \"hot\", burning sensation in his eyes, and throat has been dry.  Nabumetone Itching     Review of Systems: PER HPI    Physical Examination:  - GENERAL: NCAT, Appears well nourished   - EYES: EOMI, non-icteric, no proptosis   - Ear/Nose/Throat: NCAT, no visible inflammation or masses   - CARDIOVASCULAR: no cyanosis, no visible JVD   - RESPIRATORY: respiratory effort normal without any distress or labored breathing   - MUSCULOSKELETAL: Normal ROM of neck and upper extremities observed   - SKIN: No rash on face  - NEUROLOGIC:  No facial asymmetry (Cranial nerve 7 motor function), No gaze palsy   - PSYCHIATRIC: Normal affect, Normal insight and judgement       Data Reviewed:   Component      Latest Ref Rng & Units 4/26/2021 4/26/2021           3:20 PM  3:20 PM   TSH      0.36 - 3.74 uIU/mL  0.62   T4, Free      0.8 - 1.5 NG/DL 1.4        Assessment/Plan:     1.  Grave's disease: [employer] to [facility] on 1/26/20 and TSH was <0.006 and was given Toprol XL 50 mg daily and then saw Dr. Dari Lopez on 2/6/20 and his TSH was <0.01, FT4 was high at 3.6 and T3 was high at 338 and TPO ab was normal at 8 and TG ab was <1.0. He had a thyroid ultrasound that showed enlarged heterogeneous hypervascular thyroid gland without discrete nodule. Prominent bilateral cervical lymph nodes. His Toprol XL was increased to 100 mg daily. He returned to see Dr. Dari Lopez on 2/20/20 to review his lab results and was prescribed methimazole 5 mg bid but was told to start with just 1 tab daily and see how he felt so he has just been taking this dose. Given his FT4 and T3 levels are about 2x normal, I think he would benefit from taking 10 mg of methimazole daily to more quickly normalize his levels so asked him to go up to this dose until he comes back to see me in 6 weeks. Will keep him on Toprol XL for now but hopefully can taper off this in the future once his thyroid is better controlled. Repeat labs showed TSH < 0.01, FT4 3.5, FT3 13.5 and TRAb 29.7 in 4/20 confirming he has Grave's disease. Increased his dose to 15 mg for the next 2 months and TSH < 0.01, FT4 2.0 and T3 248 in 6/20 so increased to 20 mg daily. TSH 0.01 and FT4 normal at 1.2 and TRAb 20.8 in 9/20 so kept dose the same until next visit. TSH up to 6.09 in 11/20 so decreased to 10 mg daily and tried to taper off the Toprol XL but felt worse off toprol so he resumed.   TSH 2.16 and TRAb 16.8 in 1/21 so decreased to 10 mg 6x/week and TSH 0.62 in 4/21 so decreased to 10 mg 5x/week  - decrease methimazole to 10 mg 6x/week  - cont Toprol XL 50 mg daily  - check TSH and free T4 prior to next visit  - check TSH receptor ab prior to next visit         Follow-up and Dispositions    · Return 10/20/21 at 2:20pm.           Copy sent to:  Alexander Paz MD      Lab follow up: 11/11/21    Component      Latest Ref Rng & Units 11/9/2021 11/9/2021 11/9/2021           9:06 AM  9:06 AM  9:06 AM   T4, Free      0.8 - 1.5 NG/DL  1.2    TSH      0.36 - 3.74 uIU/mL   0.82   Thyrotropin Receptor Ab, serum      0.00 - 1.75 IU/L 11.60 (H)       Your thyroid tests remain normal so your current dose of methimazole is working well and I will keep your dose the same until you come back in February. -------------------------------------------------------------------------------------------------------------------  Your TSH receptor antibody, which is a test for Grave's disease, is high at 11.6 but down from 16.8 at your last check which means your Grave's disease is less active than at last check. As long as this is elevated over 1.1, we will not be able to stop the medication and will try to get to the lowest dose possible that keeps your levels normal and hopefully in the future will be able to taper you off medication.  -------------------------------------------------------------------------------------------------------------------  Will have one of the [address] call him with his lab results.

## 2021-05-19 NOTE — PROGRESS NOTES
Coordination of Care  1. Have you been to the ER, urgent care clinic since your last visit? Hospitalized since your last visit? No    2. Have you seen or consulted any other health care providers outside of the [address] [place] [address] since your last visit? Include any pap smears or colon screening. No    Does the patient need refills? YES    Learning Assessment Complete? yes  Depression Screening complete in the past 12 months? yes    Leeroy Bey with [employer] assisted with this phone call today.

## 2021-05-19 NOTE — TELEPHONE ENCOUNTER
The pt had an appt with the Specialist Dr Mame Kaye today. Also AN coordinator call the pt yesterday about his AN Specialist and told him to call the AN line on or after today. Closing encounter. Arminda Wu RN

## 2021-07-19 RX ORDER — METOPROLOL SUCCINATE 50 MG/1
TABLET, EXTENDED RELEASE ORAL
Qty: 90 TABLET | Refills: 3 | Status: SHIPPED | OUTPATIENT
Start: 2021-07-19 | End: 2022-07-14

## 2021-08-02 ENCOUNTER — TRANSCRIBE ORDER (OUTPATIENT)
Dept: SCHEDULING | Age: 46
End: 2021-08-02

## 2021-08-02 DIAGNOSIS — M51.26 LUMBAR DISC HERNIATION: ICD-10-CM

## 2021-08-02 DIAGNOSIS — M48.061 DEGENERATIVE LUMBAR SPINAL STENOSIS: ICD-10-CM

## 2021-08-02 DIAGNOSIS — M54.16 RIGHT LUMBAR RADICULOPATHY: Primary | ICD-10-CM

## 2021-08-04 ENCOUNTER — HOSPITAL ENCOUNTER (OUTPATIENT)
Dept: MRI IMAGING | Age: 46
Discharge: HOME | End: 2021-08-04
Attending: PHYSICAL MEDICINE & REHABILITATION
Payer: SUBSIDIZED

## 2021-08-04 DIAGNOSIS — M48.061 DEGENERATIVE LUMBAR SPINAL STENOSIS: ICD-10-CM

## 2021-08-04 DIAGNOSIS — M54.16 RIGHT LUMBAR RADICULOPATHY: ICD-10-CM

## 2021-08-04 DIAGNOSIS — M51.26 LUMBAR DISC HERNIATION: ICD-10-CM

## 2021-08-04 PROCEDURE — 72148 MRI LUMBAR SPINE W/O DYE: CPT

## 2021-09-22 ENCOUNTER — TELEPHONE (OUTPATIENT)
Dept: FAMILY MEDICINE CLINIC | Age: 46
End: 2021-09-22

## 2021-10-25 ENCOUNTER — TELEPHONE (OUTPATIENT)
Dept: FAMILY MEDICINE CLINIC | Age: 46
End: 2021-10-25

## 2021-10-25 RX ORDER — METHIMAZOLE 10 MG/1
TABLET ORAL
Qty: 65 TABLET | Refills: 0 | Status: SHIPPED | OUTPATIENT
Start: 2021-10-25 | End: 2022-01-18 | Stop reason: SDUPTHER

## 2021-10-25 NOTE — TELEPHONE ENCOUNTER
With [facility]  # [id number], I called patient and rescheduled him for appointment with Dr. Selena Gary on 2/2/22 at 3:40. Dr. Selena Gary he is asking for refills in the meantime. Do you want him to go ahead and get his labs done that he missed in October? Registrar please call patient and schedule for labs if Dr. Selena Gary says to go ahead and do so. Thank you.  Amy Cornejo RN

## 2021-10-25 NOTE — TELEPHONE ENCOUNTER
Yes.  Please have him get his labs drawn now and I can see if we need to adjust his dose. I [facility].

## 2021-10-25 NOTE — TELEPHONE ENCOUNTER
Kirill Xavier  Call main office 10/25/2021 wanted to schedule an apt with efrem Talamantes and refills for medicine patient will be waiting for a phone call . Jose Burgess     Thank you   Sandra Soriano

## 2021-11-09 ENCOUNTER — LAB ONLY (OUTPATIENT)
Dept: FAMILY MEDICINE CLINIC | Age: 46
End: 2021-11-09

## 2021-11-09 ENCOUNTER — HOSPITAL ENCOUNTER (OUTPATIENT)
Dept: LAB | Age: 46
Discharge: HOME | End: 2021-11-09

## 2021-11-09 DIAGNOSIS — E05.00 GRAVES DISEASE: ICD-10-CM

## 2021-11-09 PROCEDURE — 84443 ASSAY THYROID STIM HORMONE: CPT

## 2021-11-09 PROCEDURE — 83520 IMMUNOASSAY QUANT NOS NONAB: CPT

## 2021-11-09 PROCEDURE — 84439 ASSAY OF FREE THYROXINE: CPT

## 2021-11-09 RX ORDER — DICLOFENAC SODIUM 75 MG/1
75 TABLET, DELAYED RELEASE ORAL 2 TIMES DAILY
COMMUNITY
Start: 2021-10-01 | End: 2022-06-15

## 2021-11-09 NOTE — PROGRESS NOTES
Patient was here for labs only per provider.  Labs were drawn on [facility] with no complications

## 2021-11-10 LAB
T4 FREE SERPL-MCNC: 1.2 NG/DL (ref 0.8–1.5)
TSH SERPL DL<=0.05 MIU/L-ACNC: 0.82 UIU/ML (ref 0.36–3.74)

## 2021-11-11 ENCOUNTER — TELEPHONE (OUTPATIENT)
Dept: ENDOCRINOLOGY | Age: 46
End: 2021-11-11

## 2021-11-11 LAB — TSH RECEP AB SER-ACNC: 11.6 IU/L (ref 0–1.75)

## 2021-11-12 NOTE — TELEPHONE ENCOUNTER
Please call him with his lab results: Your thyroid tests remain normal so your current dose of methimazole is working well and I will keep your dose the same until you come back in February. -------------------------------------------------------------------------------------------------------------------  Your TSH receptor antibody, which is a test for Grave's disease, is high at 11.6 but down from 16.8 at your last check which means your Grave's disease is less active than at last check. As long as this is elevated over 1.1, we will not be able to stop the medication and will try to get to the lowest dose possible that keeps your levels normal and hopefully in the future will be able to taper you off medication.

## 2021-11-18 ENCOUNTER — OFFICE VISIT (OUTPATIENT)
Dept: FAMILY MEDICINE CLINIC | Age: 46
End: 2021-11-18

## 2021-11-18 VITALS
BODY MASS INDEX: 37.9 KG/M2 | DIASTOLIC BLOOD PRESSURE: 90 MMHG | HEIGHT: 64 IN | OXYGEN SATURATION: 99 % | TEMPERATURE: 98.4 F | WEIGHT: 222 LBS | HEART RATE: 82 BPM | SYSTOLIC BLOOD PRESSURE: 144 MMHG

## 2021-11-18 DIAGNOSIS — H04.203 EXCESSIVE TEAR PRODUCTION OF BOTH LACRIMAL GLANDS: ICD-10-CM

## 2021-11-18 DIAGNOSIS — E05.90 HYPERTHYROIDISM: ICD-10-CM

## 2021-11-18 DIAGNOSIS — H57.13 PAIN OF BOTH EYES: Primary | ICD-10-CM

## 2021-11-18 DIAGNOSIS — M26.629 TMJ SYNDROME: ICD-10-CM

## 2021-11-18 PROCEDURE — 99213 OFFICE O/P EST LOW 20 MIN: CPT | Performed by: FAMILY MEDICINE

## 2021-11-18 RX ORDER — KETOTIFEN FUMARATE 0.35 MG/ML
1 SOLUTION/ DROPS OPHTHALMIC 2 TIMES DAILY
Qty: 5 ML | Refills: 2 | Status: SHIPPED | OUTPATIENT
Start: 2021-11-18 | End: 2021-11-28

## 2021-11-18 RX ORDER — CYCLOBENZAPRINE HCL 10 MG
10 TABLET ORAL
Qty: 15 TABLET | Refills: 0 | Status: SHIPPED | OUTPATIENT
Start: 2021-11-18 | End: 2022-02-02

## 2021-11-18 NOTE — TELEPHONE ENCOUNTER
, Delmar Nose, LPN  You 42 minutes ago (2:22 PM)     Geovanna Miranda    Mr. Rufina Hester was in to see Dr. Brayden Ochoa today & your message regarding the bloodwork was communicated & documented in his chart.   He indicated understanding & is aware of his follow-up appt in Feb.    Message text

## 2021-11-18 NOTE — PROGRESS NOTES
Coordination of Care  1. Have you been to the ER, urgent care clinic since your last visit? Hospitalized since your last visit? No    2. Have you seen or consulted any other health care providers outside of the [address] [place] [address] since your last visit? Include any pap smears or colon screening. No    Does the patient need refills? YES    Learning Assessment Complete?  yes  Depression Screening complete in the past 12 months? yes

## 2021-11-18 NOTE — PROGRESS NOTES
I have printed AVS and reviewed it with patient today. I have copied the provider's check out note here and have reviewed these items with the patient today: Check-out Note: Refer to ophthalmology AN   Good rx   I reviewed the access now information with the patient. Patient verbalized understanding. The patient was given a coupon for Access Now. Patient verbalized understanding.  Gallito Mullins RN

## 2021-11-18 NOTE — PROGRESS NOTES
HISTORY OF PRESENT ILLNESS  Jodi Harper is a 55 y.o. male. HPI  Patient states about  6 months ago he had noticed his eyes feel burning and his vision has changed. A Few years back he had a problem with his left lower eyelid and since then it bothers him. He also has noticed some protrusion of the eye. He has blurry vision as well    Also has noticed he has a pain in the left side of the face when he chews and when he swallows and when he breaths, it feels like the left side of the face hurts. The pain starts in the left TMJ and has been going on for 2 weeks    He has hyperthyroidism, has not seen an eye doctor    Review of Systems   Constitutional: Negative for chills, fever and weight loss. HENT:        Left TMJ pain   Eyes: Positive for blurred vision, pain and discharge. Respiratory: Negative for cough, hemoptysis and sputum production. Cardiovascular: Negative for chest pain, palpitations and orthopnea. Musculoskeletal: Negative for joint pain. Skin: Negative for itching and rash. BP (!) 144/90 (BP 1 Location: Right arm, BP Patient Position: Sitting)   Pulse 82   Temp 98.4 °F (36.9 °C) (Oral)   Ht 5' 4.37\" (1.635 m)   Wt 222 lb (100.7 kg)   SpO2 99%   BMI 37.67 kg/m²   Physical Exam  Constitutional:       General: He is not in acute distress. Appearance: He is not ill-appearing. HENT:      Right Ear: Tympanic membrane normal.      Left Ear: Tympanic membrane normal.      Ears:      Comments: Left TMJ click  Eyes:      General:         Right eye: No discharge. Left eye: No discharge. Extraocular Movements: Extraocular movements intact. Conjunctiva/sclera: Conjunctivae normal.   Pulmonary:      Effort: Pulmonary effort is normal. No respiratory distress. Breath sounds: No wheezing or rhonchi. Abdominal:      General: Bowel sounds are normal. There is no distension. Palpations: Abdomen is soft. There is no mass.    Musculoskeletal:         General: No swelling, tenderness or deformity. Right lower leg: No edema. Left lower leg: No edema. Neurological:      Mental Status: He is alert. ASSESSMENT and PLAN  Diagnoses and all orders for this visit:    1. Pain of both eyes  -     ketotifen (ZADITOR) 0.025 % (0.035 %) ophthalmic solution; Administer 1 Drop to both eyes two (2) times a day for 10 days.  -     REFERRAL TO OPHTHALMOLOGY    2. TMJ syndrome  -     cyclobenzaprine (FLEXERIL) 10 mg tablet; Take 1 Tablet by mouth nightly as needed for Muscle Spasm(s). 3. Excessive tear production of both lacrimal glands  -     REFERRAL TO OPHTHALMOLOGY    4.  Hyperthyroidism  -     REFERRAL TO OPHTHALMOLOGY      55year old with hyperthyroidism who has been presenting eye pain, excessive tearing and blurred vision of both eyes  We will refer to ophthalmology  He has also been experiencing symptoms of TMJ,  Will treat with muscle relaxant as needed

## 2021-11-18 NOTE — PROGRESS NOTES
While patient was in the clinic to see Dr. Karena Kirkpatrick today, patient was given Dr. Ashley Ruvalcaba message regarding recent bloodwork results. Per Dr. Ashley Ruvalcaba instruction & with the assistance of interpretor Waleska Dumont the following was communicated: \"Thyroid test is normal.  Continue current dose of methimazole the same & return in Feb for follow-up. Medication is working well. Your TSH receptor antibody, which is a test for Grave's Disease is high at 11.6 but down from 16.8 at your last check which means that your Grave's Disease is less active than at your last check. As long as this is elevated over 1.1, we will not be able to stop the medication but we will try to get to the lowest possible dose that keeps your levels normal.  Hopefully, in the future, we will be able to taper you off of medication. \"  Patient indicated understanding & appreciated the service today.

## 2021-11-19 ENCOUNTER — VIRTUAL VISIT (OUTPATIENT)
Dept: FAMILY MEDICINE CLINIC | Age: 46
End: 2021-11-19

## 2021-11-19 DIAGNOSIS — Z71.89 COUNSELING AND COORDINATION OF CARE: Primary | ICD-10-CM

## 2021-11-23 ENCOUNTER — OFFICE VISIT (OUTPATIENT)
Dept: FAMILY MEDICINE CLINIC | Age: 46
End: 2021-11-23

## 2021-11-23 DIAGNOSIS — Z71.89 COUNSELING AND COORDINATION OF CARE: Primary | ICD-10-CM

## 2021-11-23 PROCEDURE — 99080 SPECIAL REPORTS OR FORMS: CPT | Performed by: FAMILY MEDICINE

## 2021-11-23 NOTE — PROGRESS NOTES
AN financial screening for renewal has been completed. Patient has been instructed to call appointment line on or after 12/7/21. Patient opted out of [facility].

## 2022-01-18 RX ORDER — METHIMAZOLE 10 MG/1
TABLET ORAL
Qty: 65 TABLET | Refills: 3 | Status: SHIPPED | OUTPATIENT
Start: 2022-01-18 | End: 2022-01-24

## 2022-01-18 NOTE — TELEPHONE ENCOUNTER
Tc to the pt. Returning his call. The pt is requesting a refill on his Methimazole 10 mg. He said he only has 4 more pills left. I told him the message would be sent to the ordering provider he can check back with his Pharmacy tonight or tomorrow. He can call the [facility] if he has not received the refill after tomorrow.  Richard Perez RN

## 2022-01-18 NOTE — TELEPHONE ENCOUNTER
Yasmin Castrejon  Call main office requesting medicine refills , patient will be waiting for a phone call .     Thank you   Magaly Abbott

## 2022-01-19 ENCOUNTER — LAB ONLY (OUTPATIENT)
Dept: FAMILY MEDICINE CLINIC | Age: 47
End: 2022-01-19

## 2022-01-19 ENCOUNTER — HOSPITAL ENCOUNTER (OUTPATIENT)
Dept: LAB | Age: 47
Discharge: HOME | End: 2022-01-19

## 2022-01-19 DIAGNOSIS — E05.00 GRAVES DISEASE: Primary | ICD-10-CM

## 2022-01-19 DIAGNOSIS — E05.00 GRAVES DISEASE: ICD-10-CM

## 2022-01-19 PROCEDURE — 83520 IMMUNOASSAY QUANT NOS NONAB: CPT

## 2022-01-19 PROCEDURE — 84439 ASSAY OF FREE THYROXINE: CPT

## 2022-01-19 PROCEDURE — 84443 ASSAY THYROID STIM HORMONE: CPT

## 2022-01-20 LAB
T4 FREE SERPL-MCNC: 1.2 NG/DL (ref 0.8–1.5)
TSH SERPL DL<=0.05 MIU/L-ACNC: 1.76 UIU/ML (ref 0.36–3.74)

## 2022-01-21 LAB — TSH RECEP AB SER-ACNC: 10.8 IU/L (ref 0–1.75)

## 2022-01-24 RX ORDER — METHIMAZOLE 10 MG/1
TABLET ORAL
Qty: 65 TABLET | Refills: 3 | Status: SHIPPED | OUTPATIENT
Start: 2022-01-24 | End: 2022-02-02

## 2022-02-02 ENCOUNTER — OFFICE VISIT (OUTPATIENT)
Dept: FAMILY MEDICINE CLINIC | Age: 47
End: 2022-02-02

## 2022-02-02 VITALS
TEMPERATURE: 97.7 F | BODY MASS INDEX: 38.45 KG/M2 | DIASTOLIC BLOOD PRESSURE: 82 MMHG | SYSTOLIC BLOOD PRESSURE: 118 MMHG | HEART RATE: 70 BPM | WEIGHT: 225.2 LBS | HEIGHT: 64 IN | OXYGEN SATURATION: 97 %

## 2022-02-02 DIAGNOSIS — E05.00 GRAVES DISEASE: Primary | ICD-10-CM

## 2022-02-02 PROCEDURE — 99214 OFFICE O/P EST MOD 30 MIN: CPT | Performed by: INTERNAL MEDICINE

## 2022-02-02 RX ORDER — KETOTIFEN FUMARATE 0.35 MG/ML
SOLUTION/ DROPS OPHTHALMIC
COMMUNITY
End: 2022-07-14

## 2022-02-02 RX ORDER — METHIMAZOLE 10 MG/1
TABLET ORAL
Qty: 65 TABLET | Refills: 3
Start: 2022-02-02

## 2022-02-02 NOTE — PROGRESS NOTES
Chief Complaint   Patient presents with    Thyroid Problem     f/u      History of Present Illness: Carmen Painting is a 55 y.o. male here for follow up of thyroid. Compliant with MMI 5x/week and rarely may have chest pain or palpitations. No tremors. No heat or cold intolerance. Bowels are mostly fine if he takes MMI with food but has had some stomach pain if he takes without food. Compliant with toprol XL. Overall energy is okay but has had some fatigue and brain fog after having COVID in 10/21. Current Outpatient Medications   Medication Sig    methIMAzole (TAPAZOLE) 10 mg tablet TAKE 1 TABLET BY MOUTH 5 TIMES A WEEK AND SKIP SUNDAY AND WEDNESDAY    metoprolol succinate (TOPROL-XL) 50 mg XL tablet Take 1 tablet by mouth once daily    ketotifen (Zaditor) 0.025 % (0.035 %) ophthalmic solution 1 drop into affected eye    diclofenac EC (VOLTAREN) 75 mg EC tablet Take 75 mg by mouth two (2) times a day. (Patient not taking: Reported on 11/18/2021)     No current facility-administered medications for this visit. Allergies   Allergen Reactions    Celebrex [Celecoxib] Other (comments)     Patient reports his body has felt \"hot\", burning sensation in his eyes, and throat has been dry.  Nabumetone Itching     Review of Systems: PER HPI    Physical Examination:  Blood pressure 118/82, pulse 70, temperature 97.7 °F (36.5 °C), temperature source Temporal, height 5' 4.17\" (1.63 m), weight 225 lb 3.2 oz (102.2 kg), SpO2 97 %.   - General: pleasant, no distress, good eye contact   - Neck: (+) small goiter, no thyroid bruits  - Cardiovascular: regular, normal rate, nl s1 and s2, no m/r/g   - Respiratory: clear to auscultation bilaterally   - Integumentary: skin is normal, no edema  - Neurological: reflexes 2+ at biceps, no tremors  - Psychiatric: normal mood and affect    Data Reviewed:   Component      Latest Ref Rng & Units 1/19/2022 1/19/2022 1/19/2022           7:15 PM  7:15 PM  7:15 PM   T4, Free      0.8 - 1.5 NG/DL  1.2    TSH      0.36 - 3.74 uIU/mL   1.76   Thyrotropin Receptor Ab, serum      0.00 - 1.75 IU/L 10.80 (H)         Assessment/Plan:     1. Grave's disease: Went to [facility] on 1/26/20 and TSH was <0.006 and was given Toprol XL 50 mg daily and then saw Dr. Italia Hannah on 2/6/20 and his TSH was <0.01, FT4 was high at 3.6 and T3 was high at 338 and TPO ab was normal at 8 and TG ab was <1.0. He had a thyroid ultrasound that showed enlarged heterogeneous hypervascular thyroid gland without discrete nodule. Prominent bilateral cervical lymph nodes. His Toprol XL was increased to 100 mg daily. He returned to see Dr. Italia Hannah on 2/20/20 to review his lab results and was prescribed methimazole 5 mg bid but was told to start with just 1 tab daily and see how he felt so he has just been taking this dose. Given his FT4 and T3 levels are about 2x normal, I think he would benefit from taking 10 mg of methimazole daily to more quickly normalize his levels so asked him to go up to this dose until he comes back to see me in 6 weeks. Will keep him on Toprol XL for now but hopefully can taper off this in the future once his thyroid is better controlled. Repeat labs showed TSH < 0.01, FT4 3.5, FT3 13.5 and TRAb 29.7 in 4/20 confirming he has Grave's disease. Increased his dose to 15 mg for the next 2 months and TSH < 0.01, FT4 2.0 and T3 248 in 6/20 so increased to 20 mg daily. TSH 0.01 and FT4 normal at 1.2 and TRAb 20.8 in 9/20 so kept dose the same until next visit. TSH up to 6.09 in 11/20 so decreased to 10 mg daily and tried to taper off the Toprol XL but felt worse off toprol so he resumed. TSH 2.16 and TRAb 16.8 in 1/21 so decreased to 10 mg 6x/week and TSH 0.62 in 4/21 so decreased to 10 mg 5x/week and TSH 0.82 and TRAb 11.6 in 11/21 so kept dose the same. TSH 1.76 and TRAb 10.8 in 1/22 so decreased to 10 mg 4x/week.   - decrease methimazole to 10 mg 4x/week  - cont Toprol XL 50 mg daily  - check TSH and free T4 prior to next visit  - check TSH receptor ab prior to next visit         Patient Instructions   1) Carlee methimazole 1 pastilla 4 frankel por semana y no carlee tiffany Esteves      Follow-up and Dispositions    · Return 6/15/22 at 1:40pm.

## 2022-02-02 NOTE — PROGRESS NOTES
AVS printed & reviewed with patient. Patient advised of follow-up appt with Dr. Ivon Penny on 6/15 at 1:40pm.  Patient indicated understanding & appreciated the service.

## 2022-03-18 PROBLEM — E05.90 HYPERTHYROIDISM: Status: ACTIVE | Noted: 2020-03-04

## 2022-03-19 PROBLEM — E66.01 SEVERE OBESITY (HCC): Status: ACTIVE | Noted: 2020-11-25

## 2022-05-31 PROBLEM — R63.1 EXCESSIVE THIRST: Status: ACTIVE | Noted: 2022-05-31

## 2022-05-31 PROBLEM — G56.03 BILATERAL CARPAL TUNNEL SYNDROME: Status: ACTIVE | Noted: 2022-05-31

## 2022-05-31 PROBLEM — H93.12 TINNITUS OF LEFT EAR: Status: ACTIVE | Noted: 2022-05-31

## 2022-05-31 PROBLEM — M19.029 LOCALIZED, PRIMARY OSTEOARTHRITIS OF ELBOW: Status: ACTIVE | Noted: 2022-05-31

## 2022-05-31 PROBLEM — M26.609 TEMPOROMANDIBULAR JOINT SYNDROME: Status: ACTIVE | Noted: 2022-05-31

## 2022-05-31 PROBLEM — H52.4 PRESBYOPIA: Status: ACTIVE | Noted: 2022-05-31

## 2022-05-31 PROBLEM — K21.9 GASTROESOPHAGEAL REFLUX DISEASE: Status: ACTIVE | Noted: 2022-05-31

## 2022-05-31 PROBLEM — H54.7 REDUCED VISUAL ACUITY: Status: ACTIVE | Noted: 2022-05-31

## 2022-05-31 PROBLEM — F43.9 STRESS AT HOME: Status: ACTIVE | Noted: 2022-05-31

## 2022-05-31 PROBLEM — S52.001A CLOSED FRACTURE OF PROXIMAL END OF RIGHT ULNA: Status: ACTIVE | Noted: 2022-05-31

## 2022-05-31 PROBLEM — M25.561 PAIN IN RIGHT KNEE: Status: ACTIVE | Noted: 2022-05-31

## 2022-05-31 PROBLEM — I10 HYPERTENSION: Status: ACTIVE | Noted: 2022-05-31

## 2022-05-31 PROBLEM — E78.5 HYPERLIPIDEMIA: Status: ACTIVE | Noted: 2022-05-31

## 2022-05-31 PROBLEM — H11.009 PTERYGIUM: Status: ACTIVE | Noted: 2022-05-31

## 2022-06-01 ENCOUNTER — LAB ONLY (OUTPATIENT)
Dept: FAMILY MEDICINE CLINIC | Age: 47
End: 2022-06-01

## 2022-06-01 ENCOUNTER — HOSPITAL ENCOUNTER (OUTPATIENT)
Dept: LAB | Age: 47
Discharge: HOME | End: 2022-06-01

## 2022-06-01 DIAGNOSIS — E05.00 GRAVES DISEASE: ICD-10-CM

## 2022-06-01 PROCEDURE — 84439 ASSAY OF FREE THYROXINE: CPT

## 2022-06-01 PROCEDURE — 83036 HEMOGLOBIN GLYCOSYLATED A1C: CPT

## 2022-06-01 PROCEDURE — 83520 IMMUNOASSAY QUANT NOS NONAB: CPT

## 2022-06-01 PROCEDURE — 84443 ASSAY THYROID STIM HORMONE: CPT

## 2022-06-02 LAB
EST. AVERAGE GLUCOSE BLD GHB EST-MCNC: 111 MG/DL
HBA1C MFR BLD: 5.5 % (ref 4–5.6)
T4 FREE SERPL-MCNC: 1.1 NG/DL (ref 0.8–1.5)
TSH SERPL DL<=0.05 MIU/L-ACNC: 0.54 UIU/ML (ref 0.36–3.74)

## 2022-06-03 LAB — TSH RECEP AB SER-ACNC: 10.2 IU/L (ref 0–1.75)

## 2022-06-15 ENCOUNTER — OFFICE VISIT (OUTPATIENT)
Dept: FAMILY MEDICINE CLINIC | Age: 47
End: 2022-06-15

## 2022-06-15 VITALS
DIASTOLIC BLOOD PRESSURE: 98 MMHG | OXYGEN SATURATION: 99 % | BODY MASS INDEX: 38.93 KG/M2 | WEIGHT: 228 LBS | HEART RATE: 86 BPM | TEMPERATURE: 98.1 F | SYSTOLIC BLOOD PRESSURE: 147 MMHG

## 2022-06-15 DIAGNOSIS — E55.9 VITAMIN D DEFICIENCY: ICD-10-CM

## 2022-06-15 DIAGNOSIS — E05.00 GRAVES DISEASE: Primary | ICD-10-CM

## 2022-06-15 PROCEDURE — 99214 OFFICE O/P EST MOD 30 MIN: CPT | Performed by: INTERNAL MEDICINE

## 2022-06-15 RX ORDER — ACETAMINOPHEN 500 MG
4000 TABLET ORAL DAILY
COMMUNITY

## 2022-06-15 NOTE — PATIENT INSTRUCTIONS
1) Puede tratar vitamin D3 2000 unidades 1 capsulo diario para dolor de las piernas y voy a chequiar robertson nivel de vitamin D en Octobre. 2) Sigue tomando methimazole 1 pastilla 4 frankel por semana.

## 2022-06-15 NOTE — PROGRESS NOTES
Coordination of Care  1. Have you been to the ER, urgent care clinic since your last visit? Hospitalized since your last visit? No    2. Have you seen or consulted any other health care providers outside of the [address] [place] [address] since your last visit? Include any pap smears or colon screening. No    Does the patient need refills? N/A    Learning Assessment Complete?  yes

## 2022-06-15 NOTE — PROGRESS NOTES
AVS printed & reviewed with patient. Patient is to return on 10/19 at 1:40pm for follow-up with Dr. Shae Lou. Patient is requesting an appointment for a physical check with Dr. Laila Millan. Patient escorted to  to schedule this. Patient indicated understanding & appreciated the service today.

## 2022-06-15 NOTE — PROGRESS NOTES
Chief Complaint   Patient presents with    Thyroid Problem     f/u    Leg Pain     History of Present Illness: Pedro Smith is a 55 y.o. male here for follow up of thyroid. Weight up 3 lbs since last visit in 2/22. Compliant with methimazole 10 mg 4x/week. No chest pain aside from on rare occasion on the left side of his rib cage. No palpitations or tremor. No heat or cold intolerance. Sometimes can feel his stomach is at times bloated and bowels can be irregular. Energy is good. Has had some pains in his legs and previously recalls taking weekly vitamin D and felt much better on this so advised him to take 2000 units daily and we'll repeat his labs prior to next visit. Asked to have a physical with Dr. Joann Ayers and told him I would have Jade Gibbons assist with making this appointment. Has had some times where he feels more tired in the morning and advised him to move the metoprolol to bedtime. Current Outpatient Medications   Medication Sig    ketotifen (Zaditor) 0.025 % (0.035 %) ophthalmic solution 1 drop into affected eye    methIMAzole (TAPAZOLE) 10 mg tablet TAKE 1 TABLET BY MOUTH 4 TIMES A WEEK AND SKIP SUNDAY AND Wednesday AND Friday--Dose change 02/02/22--updated med list--did not send prescription to the pharmacy    metoprolol succinate (TOPROL-XL) 50 mg XL tablet Take 1 tablet by mouth once daily     No current facility-administered medications for this visit. Allergies   Allergen Reactions    Celebrex [Celecoxib] Other (comments)     Patient reports his body has felt \"hot\", burning sensation in his eyes, and throat has been dry.  Nabumetone Itching     Review of Systems: PER HPI    Physical Examination:  Blood pressure (!) 147/98, pulse 86, temperature 98.1 °F (36.7 °C), temperature source Temporal, weight 228 lb (103.4 kg), SpO2 99 %.   - General: pleasant, no distress, good eye contact   - Neck: small goiter, no thyroid bruits  - Cardiovascular: regular, normal rate, nl s1 and s2, no m/r/g   - Respiratory: clear to auscultation bilaterally   - Integumentary: skin is normal, no edema  - Neurological: reflexes 2+ at biceps, no tremors  - Psychiatric: normal mood and affect    Data Reviewed:   Component      Latest Ref Rng & Units 6/1/2022 6/1/2022 6/1/2022          10:47 AM 10:47 AM 10:47 AM   T4, Free      0.8 - 1.5 NG/DL   1.1   TSH      0.36 - 3.74 uIU/mL  0.54    Thyrotropin Receptor Ab, serum      0.00 - 1.75 IU/L 10.20 (H)       Assessment/Plan:     1. Grave's disease: Went to [facility] on 1/26/20 and TSH was <0.006 and was given Toprol XL 50 mg daily and then saw Dr. Stefanie Schmitz on 2/6/20 and his TSH was <0.01, FT4 was high at 3.6 and T3 was high at 338 and TPO ab was normal at 8 and TG ab was <1.0. He had a thyroid ultrasound that showed enlarged heterogeneous hypervascular thyroid gland without discrete nodule. Prominent bilateral cervical lymph nodes. His Toprol XL was increased to 100 mg daily. He returned to see Dr. Stefanie Schmitz on 2/20/20 to review his lab results and was prescribed methimazole 5 mg bid but was told to start with just 1 tab daily and see how he felt so he has just been taking this dose. Given his FT4 and T3 levels are about 2x normal, I think he would benefit from taking 10 mg of methimazole daily to more quickly normalize his levels so asked him to go up to this dose until he comes back to see me in 6 weeks. Will keep him on Toprol XL for now but hopefully can taper off this in the future once his thyroid is better controlled. Repeat labs showed TSH < 0.01, FT4 3.5, FT3 13.5 and TRAb 29.7 in 4/20 confirming he has Grave's disease. Increased his dose to 15 mg for the next 2 months and TSH < 0.01, FT4 2.0 and T3 248 in 6/20 so increased to 20 mg daily. TSH 0.01 and FT4 normal at 1.2 and TRAb 20.8 in 9/20 so kept dose the same until next visit.   TSH up to 6.09 in 11/20 so decreased to 10 mg daily and tried to taper off the Toprol XL but felt worse off toprol so he resumed. TSH 2.16 and TRAb 16.8 in 1/21 so decreased to 10 mg 6x/week and TSH 0.62 in 4/21 so decreased to 10 mg 5x/week and TSH 0.82 and TRAb 11.6 in 11/21 so kept dose the same. TSH 1.76 and TRAb 10.8 in 1/22 so decreased to 10 mg 4x/week and TSH 0.54 and TRAb 10.2 in 6/22 so kept dose the same  - cont methimazole 10 mg 4x/week  - cont Toprol XL 50 mg daily but move to bedtime  - check TSH and free T4 prior to next visit  - check TSH receptor ab prior to next visit         2. Vitamin D deficiency: Has had some pains in his legs and previously recalls taking weekly vitamin D and felt much better on this so advised him to take 2000 units daily and we'll repeat his labs prior to next visit. - begin vitamin D 2000 units daily  - check Vitamin D 25-OH level and bmp prior to next visit          Patient Instructions   1) Puede tratar vitamin D3 2000 unidades 1 capsulo diario para dolor de las piernas y voy a chequiar robertson nivel de vitamin D en Octobre. 2) Sigue tomando methimazole 1 pastilla 4 frankel por semana.            Follow-up and Dispositions    · Return 10/19/22 at 1:40pm.               Copy sent to:  Mary Grace Frias MD

## 2022-07-14 ENCOUNTER — HOSPITAL ENCOUNTER (OUTPATIENT)
Dept: LAB | Age: 47
Discharge: HOME | End: 2022-07-14

## 2022-07-14 ENCOUNTER — OFFICE VISIT (OUTPATIENT)
Dept: FAMILY MEDICINE CLINIC | Age: 47
End: 2022-07-14

## 2022-07-14 VITALS
HEART RATE: 74 BPM | WEIGHT: 225 LBS | SYSTOLIC BLOOD PRESSURE: 142 MMHG | OXYGEN SATURATION: 100 % | BODY MASS INDEX: 38.41 KG/M2 | TEMPERATURE: 98.2 F | DIASTOLIC BLOOD PRESSURE: 94 MMHG

## 2022-07-14 DIAGNOSIS — I10 HYPERTENSION, UNSPECIFIED TYPE: ICD-10-CM

## 2022-07-14 DIAGNOSIS — Z12.11 SCREEN FOR COLON CANCER: ICD-10-CM

## 2022-07-14 DIAGNOSIS — E05.90 HYPERTHYROIDISM: ICD-10-CM

## 2022-07-14 DIAGNOSIS — Z00.01 ENCOUNTER FOR GENERAL ADULT MEDICAL EXAMINATION WITH ABNORMAL FINDINGS: Primary | ICD-10-CM

## 2022-07-14 PROCEDURE — 80053 COMPREHEN METABOLIC PANEL: CPT

## 2022-07-14 PROCEDURE — 82607 VITAMIN B-12: CPT

## 2022-07-14 PROCEDURE — 85025 COMPLETE CBC W/AUTO DIFF WBC: CPT

## 2022-07-14 PROCEDURE — 80061 LIPID PANEL: CPT

## 2022-07-14 PROCEDURE — 99396 PREV VISIT EST AGE 40-64: CPT | Performed by: FAMILY MEDICINE

## 2022-07-14 PROCEDURE — 82306 VITAMIN D 25 HYDROXY: CPT

## 2022-07-14 RX ORDER — METOPROLOL SUCCINATE 100 MG/1
100 TABLET, EXTENDED RELEASE ORAL DAILY
Qty: 90 TABLET | Refills: 3 | Status: SHIPPED | OUTPATIENT
Start: 2022-07-14 | End: 2022-09-07 | Stop reason: SDUPTHER

## 2022-07-14 NOTE — PROGRESS NOTES
Mario Mesa seen at d/c, full name and  verified, given After visit Summary and reviewed today's visit with patient along with instructions on when it is recommended to come back. FIT testing kit provided to patient, label placed on collection tube, patient to collect and instructed patient to write in date that sample was collected on the label with  and full name, full instructions were reviewed with patient along with contents of kit and how to pack the sample, expressed to pt importance of dropping it off asap. Locations to our sites for this week and upcoming week given to patient. Good Rx coupons given/texted to patient as well as instructions on how to use at the pharmacy. I reviewed the medication list with the patient to ensure he knows how to and when to take his medications. Side effects, mechanisms of action and medication compliance were reiterated to ensure proper understanding. I have reviewed the provider's instructions with the patient, answering all questions to his satisfaction. Patient verbalized understanding.   Chuck Zavala RN

## 2022-07-14 NOTE — PROGRESS NOTES
Coordination of Care  1. Have you been to the ER, urgent care clinic since your last visit? Hospitalized since your last visit? No    2. Have you seen or consulted any other health care providers outside of the [address] [place] [address] since your last visit? Include any pap smears or colon screening. No    Does the patient need refills? YES    Learning Assessment Complete?  yes

## 2022-07-14 NOTE — PROGRESS NOTES
HISTORY OF PRESENT ILLNESS  Argelia Mendoza is a 55 y.o. male. HPI  Patient states he is doing well. During the morning he feels tired. Also has noticed some dry skin around the umbilical level. Has had some pains in the legs,  More so when he is standing for long periods of time. He has also noticed some leg swelling. Has not been check blood pressure at home. Takes tapazole 4 days a week  Metoprolol every day  Not taking vitamin D right now, but used to take a weekly dose in the past  Review of Systems   Eyes:        Eye irritation,  Spontaneous tear production   Respiratory: Negative for cough, sputum production, shortness of breath and wheezing. Cardiovascular: Negative for chest pain, palpitations and orthopnea. Gastrointestinal: Positive for heartburn. Negative for abdominal pain, diarrhea, nausea and vomiting. Genitourinary: Negative for dysuria, frequency and urgency. Musculoskeletal: Positive for myalgias. Lower extremity pains   Neurological: Negative for headaches. Psychiatric/Behavioral: Negative for depression, substance abuse and suicidal ideas. The patient is not nervous/anxious. Drinks no alcohol  Doesn't smoke     BP (!) 142/94 (BP 1 Location: Right arm, BP Patient Position: Sitting)   Pulse 74   Temp 98.2 °F (36.8 °C) (Temporal)   Wt 225 lb (102.1 kg)   SpO2 100%   BMI 38.41 kg/m²   Physical Exam  Constitutional:       General: He is not in acute distress. Appearance: He is not ill-appearing. HENT:      Right Ear: Tympanic membrane normal.      Left Ear: Tympanic membrane normal.   Eyes:      General:         Right eye: No discharge. Left eye: No discharge. Extraocular Movements: Extraocular movements intact. Conjunctiva/sclera: Conjunctivae normal.   Cardiovascular:      Rate and Rhythm: Normal rate and regular rhythm. Heart sounds: No murmur heard. Pulmonary:      Effort: Pulmonary effort is normal. No respiratory distress. Breath sounds: No wheezing or rhonchi. Abdominal:      General: Bowel sounds are normal. There is no distension. Palpations: Abdomen is soft. There is no mass. Musculoskeletal:         General: No swelling, tenderness or deformity. Right lower leg: No edema. Left lower leg: No edema. Neurological:      Mental Status: He is alert. ASSESSMENT and PLAN  Diagnoses and all orders for this visit:    1. Encounter for general adult medical examination with abnormal findings  -     CBC WITH AUTOMATED DIFF; Future  -     METABOLIC PANEL, COMPREHENSIVE; Future  -     LIPID PANEL; Future  -     VITAMIN D, 25 HYDROXY; Future  -     VITAMIN B12; Future    2. Hypertension, unspecified type  -     metoprolol succinate (TOPROL-XL) 100 mg tablet; Take 1 Tablet by mouth daily. 3. Hyperthyroidism    4.  Screen for colon cancer  -     OCCULT BLOOD IMMUNOASSAY,DIAGNOSTIC; Future      55year old male seen today for physical, we will check labs today  Colon cancer screening discussed, patient will have FIT test  PHQ-2 negative  No alcohol intake  His blood pressure is not well controlled, we will increase his metoprolol  Follow up in 1 month

## 2022-07-15 LAB
25(OH)D3 SERPL-MCNC: 21.1 NG/ML (ref 30–100)
ALBUMIN SERPL-MCNC: 4.2 G/DL (ref 3.5–5)
ALBUMIN/GLOB SERPL: 1.3 {RATIO} (ref 1.1–2.2)
ALP SERPL-CCNC: 109 U/L (ref 45–117)
ALT SERPL-CCNC: 54 U/L (ref 12–78)
ANION GAP SERPL CALC-SCNC: 5 MMOL/L (ref 5–15)
AST SERPL-CCNC: 27 U/L (ref 15–37)
BASOPHILS # BLD: 0 K/UL (ref 0–0.1)
BASOPHILS NFR BLD: 0 % (ref 0–1)
BILIRUB SERPL-MCNC: 0.4 MG/DL (ref 0.2–1)
BUN SERPL-MCNC: 15 MG/DL (ref 6–20)
BUN/CREAT SERPL: 15 (ref 12–20)
CALCIUM SERPL-MCNC: 9.1 MG/DL (ref 8.5–10.1)
CHLORIDE SERPL-SCNC: 104 MMOL/L (ref 97–108)
CHOLEST SERPL-MCNC: 153 MG/DL
CO2 SERPL-SCNC: 27 MMOL/L (ref 21–32)
CREAT SERPL-MCNC: 0.97 MG/DL (ref 0.7–1.3)
DIFFERENTIAL METHOD BLD: NORMAL
EOSINOPHIL # BLD: 0.1 K/UL (ref 0–0.4)
EOSINOPHIL NFR BLD: 1 % (ref 0–7)
ERYTHROCYTE [DISTWIDTH] IN BLOOD BY AUTOMATED COUNT: 12.7 % (ref 11.5–14.5)
GLOBULIN SER CALC-MCNC: 3.3 G/DL (ref 2–4)
GLUCOSE SERPL-MCNC: 107 MG/DL (ref 65–100)
HCT VFR BLD AUTO: 48.1 % (ref 36.6–50.3)
HDLC SERPL-MCNC: 27 MG/DL
HDLC SERPL: 5.7 {RATIO} (ref 0–5)
HGB BLD-MCNC: 16.1 G/DL (ref 12.1–17)
IMM GRANULOCYTES # BLD AUTO: 0 K/UL (ref 0–0.04)
IMM GRANULOCYTES NFR BLD AUTO: 0 % (ref 0–0.5)
LDLC SERPL CALC-MCNC: 54.4 MG/DL (ref 0–100)
LYMPHOCYTES # BLD: 2.3 K/UL (ref 0.8–3.5)
LYMPHOCYTES NFR BLD: 39 % (ref 12–49)
MCH RBC QN AUTO: 29.8 PG (ref 26–34)
MCHC RBC AUTO-ENTMCNC: 33.5 G/DL (ref 30–36.5)
MCV RBC AUTO: 89.1 FL (ref 80–99)
MONOCYTES # BLD: 0.4 K/UL (ref 0–1)
MONOCYTES NFR BLD: 7 % (ref 5–13)
NEUTS SEG # BLD: 3.1 K/UL (ref 1.8–8)
NEUTS SEG NFR BLD: 53 % (ref 32–75)
NRBC # BLD: 0 K/UL (ref 0–0.01)
NRBC BLD-RTO: 0 PER 100 WBC
PLATELET # BLD AUTO: 207 K/UL (ref 150–400)
PMV BLD AUTO: 10.7 FL (ref 8.9–12.9)
POTASSIUM SERPL-SCNC: 4.3 MMOL/L (ref 3.5–5.1)
PROT SERPL-MCNC: 7.5 G/DL (ref 6.4–8.2)
RBC # BLD AUTO: 5.4 M/UL (ref 4.1–5.7)
SODIUM SERPL-SCNC: 136 MMOL/L (ref 136–145)
TRIGL SERPL-MCNC: 358 MG/DL (ref ?–150)
VIT B12 SERPL-MCNC: 519 PG/ML (ref 193–986)
VLDLC SERPL CALC-MCNC: 71.6 MG/DL
WBC # BLD AUTO: 5.9 K/UL (ref 4.1–11.1)

## 2022-07-18 ENCOUNTER — TELEPHONE (OUTPATIENT)
Dept: FAMILY MEDICINE CLINIC | Age: 47
End: 2022-07-18

## 2022-07-18 NOTE — PROGRESS NOTES
Elevated triglyceride levels  Mildly decreased vitamin D  Normal blood count, normal vitamin B12, cholesterol, blood count, kidney function, liver function and electrolytes  Continue vitamin D supplementation, regular physical activity, healthy diet, patient can be informed

## 2022-07-18 NOTE — TELEPHONE ENCOUNTER
Patient was called for lab results. Person who answered phone refused to provide 2 patient identifier and would rather get results during his next in person appt.  Katy Bond RN

## 2022-07-21 ENCOUNTER — HOSPITAL ENCOUNTER (OUTPATIENT)
Dept: LAB | Age: 47
Discharge: HOME | End: 2022-07-21

## 2022-07-21 ENCOUNTER — LAB ONLY (OUTPATIENT)
Dept: FAMILY MEDICINE CLINIC | Age: 47
End: 2022-07-21

## 2022-07-21 DIAGNOSIS — Z12.11 SCREEN FOR COLON CANCER: ICD-10-CM

## 2022-07-21 PROCEDURE — 82274 ASSAY TEST FOR BLOOD FECAL: CPT

## 2022-07-21 NOTE — PROGRESS NOTES
Patient here to drop off stool sample/Fit kit for occult blood testing. Sample received & sent to lab for testing.

## 2022-07-23 LAB — HEMOCCULT STL QL IA: NEGATIVE

## 2022-07-27 NOTE — PROGRESS NOTES
Tc with the patient. I gave the patient the fit test results per doctor following results:  \"Negative fecal occult blood test.\" Patient verbalized understanding

## 2022-07-27 NOTE — PROGRESS NOTES
T/c with the patient. I gave the patient the lab results per providers following information: \"Elevated triglyceride levels. Mildly decreased vitamin D. Normal blood count, normal vitamin B12, cholesterol, blood count, kidneyfunction, liver function and electrolytes  Continue vitamin D supplementation, regular physical activity, healthy diet,patient can be informed. \" Patient verbalized understanding and patient is aware of his next follow up, face to face, with Dr Keyla Cruz, at the [facility] clinic on 8/10/2022 at 8:30 am

## 2022-08-10 ENCOUNTER — OFFICE VISIT (OUTPATIENT)
Dept: FAMILY MEDICINE CLINIC | Age: 47
End: 2022-08-10

## 2022-08-10 ENCOUNTER — HOSPITAL ENCOUNTER (OUTPATIENT)
Dept: LAB | Age: 47
Discharge: HOME | End: 2022-08-10

## 2022-08-10 VITALS
TEMPERATURE: 98 F | BODY MASS INDEX: 38.24 KG/M2 | WEIGHT: 224 LBS | DIASTOLIC BLOOD PRESSURE: 97 MMHG | OXYGEN SATURATION: 100 % | SYSTOLIC BLOOD PRESSURE: 147 MMHG | HEART RATE: 71 BPM

## 2022-08-10 DIAGNOSIS — E05.90 HYPERTHYROIDISM: Primary | ICD-10-CM

## 2022-08-10 DIAGNOSIS — I10 HYPERTENSION, UNSPECIFIED TYPE: ICD-10-CM

## 2022-08-10 DIAGNOSIS — E05.90 HYPERTHYROIDISM: ICD-10-CM

## 2022-08-10 LAB
T4 FREE SERPL-MCNC: 1.1 NG/DL (ref 0.8–1.5)
TSH SERPL DL<=0.05 MIU/L-ACNC: 1.09 UIU/ML (ref 0.36–3.74)

## 2022-08-10 PROCEDURE — 83520 IMMUNOASSAY QUANT NOS NONAB: CPT

## 2022-08-10 PROCEDURE — 99213 OFFICE O/P EST LOW 20 MIN: CPT | Performed by: FAMILY MEDICINE

## 2022-08-10 PROCEDURE — 84439 ASSAY OF FREE THYROXINE: CPT

## 2022-08-10 PROCEDURE — 84443 ASSAY THYROID STIM HORMONE: CPT

## 2022-08-10 RX ORDER — AMLODIPINE BESYLATE 2.5 MG/1
2.5 TABLET ORAL DAILY
Qty: 90 TABLET | Refills: 3 | Status: SHIPPED | OUTPATIENT
Start: 2022-08-10 | End: 2022-09-07 | Stop reason: SDUPTHER

## 2022-08-10 NOTE — PROGRESS NOTES
HISTORY OF PRESENT ILLNESS  Jodi Harper is a 55 y.o. male. HPI  Patient states He is doing well. Last week He checked his blood pressure at home and it was normal.  He has had some throat discomfort for the past 2-3 days. No issues. He is taking some vitamin D supplement. Review of Systems   Respiratory:  Negative for cough, sputum production, shortness of breath and wheezing. Cardiovascular:  Negative for chest pain, palpitations and orthopnea. Gastrointestinal:  Negative for abdominal pain, diarrhea, heartburn, nausea and vomiting. Genitourinary:  Negative for dysuria, frequency and urgency. Musculoskeletal:  Negative for myalgias. Neurological:  Negative for headaches. Psychiatric/Behavioral:  Negative for depression, substance abuse and suicidal ideas. The patient is not nervous/anxious. BP (!) 147/97 (BP 1 Location: Right upper arm, BP Patient Position: Sitting, BP Cuff Size: Adult long)   Pulse 71   Temp 98 °F (36.7 °C) (Temporal)   Wt 224 lb (101.6 kg)   SpO2 100%   BMI 38.24 kg/m²   Physical Exam  Constitutional:       General: He is not in acute distress. Appearance: He is not ill-appearing. HENT:      Right Ear: Tympanic membrane normal.      Left Ear: Tympanic membrane normal.   Eyes:      General:         Right eye: No discharge. Left eye: No discharge. Extraocular Movements: Extraocular movements intact. Conjunctiva/sclera: Conjunctivae normal.   Cardiovascular:      Rate and Rhythm: Normal rate and regular rhythm. Heart sounds: No murmur heard. Pulmonary:      Effort: Pulmonary effort is normal. No respiratory distress. Breath sounds: No wheezing or rhonchi. Abdominal:      General: Bowel sounds are normal. There is no distension. Palpations: Abdomen is soft. There is no mass. Musculoskeletal:         General: No swelling, tenderness or deformity. Right lower leg: No edema. Left lower leg: No edema.    Neurological: Mental Status: He is alert. ASSESSMENT and PLAN  Diagnoses and all orders for this visit:    1. Hyperthyroidism  -     TSH 3RD GENERATION; Future  -     TSH RECEPTOR AB; Future  -     T4, FREE; Future    2. Hypertension, unspecified type  -     amLODIPine (NORVASC) 2.5 mg tablet; Take 1 Tablet by mouth in the morning.     55year old with hyperthyroidism and hypertension,  doing well,  his blood pressure remains uncontrolled, we will add amlodipine today  Check labs today

## 2022-08-10 NOTE — PROGRESS NOTES
Coordination of Care  1. Have you been to the ER, urgent care clinic since your last visit? Hospitalized since your last visit? No    2. Have you seen or consulted any other health care providers outside of the [address] [place] [address] since your last visit? Include any pap smears or colon screening. No    Does the patient need refills? N/A    Learning Assessment Complete?  yes

## 2022-08-10 NOTE — PROGRESS NOTES
An After Visit Summary was printed and given to the patient. Instructed patient on how to administer medication per provider order.

## 2022-08-12 LAB — TSH RECEP AB SER-ACNC: 10.7 IU/L (ref 0–1.75)

## 2022-09-07 ENCOUNTER — OFFICE VISIT (OUTPATIENT)
Dept: FAMILY MEDICINE CLINIC | Age: 47
End: 2022-09-07

## 2022-09-07 VITALS — DIASTOLIC BLOOD PRESSURE: 98 MMHG | SYSTOLIC BLOOD PRESSURE: 159 MMHG

## 2022-09-07 DIAGNOSIS — I10 HYPERTENSION, UNSPECIFIED TYPE: Primary | ICD-10-CM

## 2022-09-07 DIAGNOSIS — J01.40 ACUTE NON-RECURRENT PANSINUSITIS: ICD-10-CM

## 2022-09-07 DIAGNOSIS — I99.8 ASYMMETRIC BLOOD PRESSURES: ICD-10-CM

## 2022-09-07 PROCEDURE — 99214 OFFICE O/P EST MOD 30 MIN: CPT | Performed by: FAMILY MEDICINE

## 2022-09-07 RX ORDER — METOPROLOL SUCCINATE 100 MG/1
50 TABLET, EXTENDED RELEASE ORAL DAILY
Qty: 90 TABLET | Refills: 3
Start: 2022-09-07

## 2022-09-07 RX ORDER — AMLODIPINE BESYLATE 2.5 MG/1
5 TABLET ORAL DAILY
Qty: 180 TABLET | Refills: 1
Start: 2022-09-07 | End: 2022-10-19

## 2022-09-07 NOTE — PROGRESS NOTES
Jeaneth Jauregui is a 52 y.o. male   Chief Complaint   Patient presents with    Hypertension     F/up chronic disease management; says BP was been \"normal\" at home;     Cold Symptoms     Throat pain, cough for past 3 days     >>>>>>>>>>>>>TELEPHONE ENCOUNTER<<<<<<<<<<<<<<<<<<<<      ASSESSMENT AND PLAN:    1. Hypertension, unspecified type  Okay to continue Toprol-XL at 50mg  Increase amlodipine to 5mg daily. Follow up in one month. - metoprolol succinate (TOPROL-XL) 100 mg tablet; Take 0.5 Tablets by mouth daily. Dispense: 90 Tablet; Refill: 3  - amLODIPine (NORVASC) 2.5 mg tablet; Take 2 Tablets by mouth daily. Indications: high blood pressure  Dispense: 180 Tablet; Refill: 1    2. Asymmetric blood pressures  Today's repeats showed a discrepancy of of 32mmHg f L>R. Pt thinks this might be typical, but unsure. Pt will monitor BP B/L. Asymptomatic, but will consider baseline duplex ultrasound if persisting. 3. Acute non-recurrent pansinusitis  Likely viral.  Symptomatic management. If symptoms persist for another week, RTC for consideration of ABX for ABRS. - oxymetazoline (AFRIN) 0.05 % nasal mist; 2 Sprays by Both Nostrils route two (2) times a day for 3 days. Indications: stuffy nose  Dispense: 15 mL; Refill: 0      SUBJECTIVE:    HPI:  AbdirashidMIKA Ansari is a 52 y.o. male with HTN and hyperthyroidism who presents  via telephone for BP followup. At his last visit 8/10/22, amlodipine was added to his Toprol XL 100mg. Today he is also c/o URI symptoms x 3 days. He reports that when he was taking Toprol XL 100mg he developed brief stabbing chest pain. He decreased back to 50mg and these symptoms resolved. He has been taking the amlodipine without adverse effects. He notes the BP he told there nurse was on his left arm. He repeated it on the right and it was much lower. (127/86). He believes it may always be like that. He denies dizziness with arm exertion. He denies arm pain, paresthesias, weakness.   No chest pain or palpitations. HE currently has frontal headache, sore throat, nasal congestion and facial pain. He has a mild occasionally productive cough. One side of his nose is clogged. He has not taken any medications. He has flonase left over from a previous illness. He felt febrile last night. No difficulty breathing. Review of Systems   Constitutional:  Positive for fever and malaise/fatigue. HENT:  Positive for congestion, sinus pain and sore throat. Negative for ear pain. Eyes:  Negative for blurred vision. Respiratory:  Positive for cough. Negative for shortness of breath. Cardiovascular:  Negative for chest pain, palpitations and leg swelling. Gastrointestinal:  Negative for abdominal pain, constipation, diarrhea, nausea and vomiting. Musculoskeletal:  Positive for myalgias. Neurological:  Positive for headaches. Negative for dizziness. BP (!) 169/103 (BP 1 Location: Left upper arm, BP Patient Position: Sitting)     Physical Exam - telephone encounter.

## 2022-09-07 NOTE — PROGRESS NOTES
Called pt, verified name and . Chief Complaint   Patient presents with    Hypertension     F/up chronic disease management; says BP was been \"normal\" at home;     Cold Symptoms     Throat pain, cough for past 3 days     Visit Vitals  BP (!) 169/103 (BP 1 Location: Left upper arm, BP Patient Position: Sitting)       Coordination of Care  1. Have you been to the ER, urgent care clinic since your last visit? Hospitalized since your last visit? No    2. Have you seen or consulted any other health care providers outside of the [address] [place] [address] since your last visit? Include any pap smears or colon screening. No    Does the patient need refills? NO    Learning Assessment Complete?  yes  Depression Screening complete in the past 12 months? yes

## 2022-09-27 ENCOUNTER — TELEPHONE (OUTPATIENT)
Dept: FAMILY MEDICINE CLINIC | Age: 47
End: 2022-09-27

## 2022-09-27 NOTE — TELEPHONE ENCOUNTER
Received a faxed request from the Pharmacy at the [facility] main office that Reyes Catyadielos 85 0.05% is unavailable from their vendor. Please send an alternative. This message was routed to the provider.  Jah Sanches RN

## 2022-09-28 ENCOUNTER — TELEPHONE (OUTPATIENT)
Dept: FAMILY MEDICINE CLINIC | Age: 47
End: 2022-09-28

## 2022-09-28 NOTE — TELEPHONE ENCOUNTER
Provider sent a message regarding the Dristan nasal spray Rx that by this time the pt's sxs have probably resolved and it was ok to cancel the order.  Porfirio Waters RN

## 2022-10-14 ENCOUNTER — OFFICE VISIT (OUTPATIENT)
Dept: FAMILY MEDICINE CLINIC | Age: 47
End: 2022-10-14

## 2022-10-14 ENCOUNTER — HOSPITAL ENCOUNTER (OUTPATIENT)
Dept: LAB | Age: 47
Discharge: HOME | End: 2022-10-14

## 2022-10-14 VITALS
SYSTOLIC BLOOD PRESSURE: 145 MMHG | TEMPERATURE: 98.1 F | HEART RATE: 73 BPM | BODY MASS INDEX: 38.79 KG/M2 | OXYGEN SATURATION: 98 % | WEIGHT: 227.2 LBS | DIASTOLIC BLOOD PRESSURE: 95 MMHG

## 2022-10-14 DIAGNOSIS — E05.00 GRAVES DISEASE: ICD-10-CM

## 2022-10-14 DIAGNOSIS — I10 HYPERTENSION, UNSPECIFIED TYPE: Primary | ICD-10-CM

## 2022-10-14 DIAGNOSIS — E55.9 VITAMIN D DEFICIENCY: ICD-10-CM

## 2022-10-14 DIAGNOSIS — H04.203 EXCESSIVE TEAR PRODUCTION OF BOTH LACRIMAL GLANDS: ICD-10-CM

## 2022-10-14 DIAGNOSIS — E05.90 HYPERTHYROIDISM: ICD-10-CM

## 2022-10-14 DIAGNOSIS — K08.89 PAIN, DENTAL: ICD-10-CM

## 2022-10-14 PROCEDURE — 99214 OFFICE O/P EST MOD 30 MIN: CPT | Performed by: FAMILY MEDICINE

## 2022-10-14 NOTE — PROGRESS NOTES
Nitin Lam is a 52 y.o. male   Chief Complaint   Patient presents with    Hypertension     Follow up for blood pressure. Patient c/o headache x 2 days as well as increased drainage (tears) from eyes L>R. ASSESSMENT AND PLAN:    1. Hypertension, unspecified type  Pt reports \"normal\" home values. Repeat L 129/91, R141/98  No symptoms in UE. He is sensitive to medication changes (increasing Toprol XL caused chest discomfort, increasing amlodipine caused GI discomfort)    Pt will keep a home BP log. At follow up we will review and decided if med changes are needed. Continue amlodipine 2.5mg daily and Toprol XL 50mg daily. 2. Hyperthyroidism  Repeat labs prior to visit with Dr. Marly Prasad next week. - TSH 3RD GENERATION; Future  - T4, FREE; Future  - TSH RECEPTOR AB; Future      3. Pain, dental  Given info for local dental clinics. SUBJECTIVE:    HPI:  Iraj Garcia is a 52 y.o. male who presents for HTN followup. We decreased his amlodipine to 5mg daily at his last VV 9/7, but he reports it caused GI upset so he went back to 2.5mg (and the abdominal sx resolved)  He states that at home his BP is always normal, but he cannot tell me what \"normal\" numbers are. He is compliant with his methimazole and has followup with Dr. Marly Prasad next week. He has had intermittent mild frontal headaches the past 2 days and watery discharge from both eyes (a chronic issue) slightly increased. No blurry vision, no eye pain or itching. No headache currently. He has a filling in a tooth in his lower left x16 years and that tooth has been bothering him lately - it is very sensitive to heat and cold and sometimes hurts when he chews. He'd like to see a dentist. He has been seen [facility] before for dental work. Review of Systems   Constitutional:  Negative for fever and malaise/fatigue. HENT:  Negative for congestion and sinus pain. Eyes:  Positive for discharge.  Negative for blurred vision, pain and redness. Respiratory:  Negative for cough and shortness of breath. Cardiovascular:  Negative for chest pain, palpitations and leg swelling. Gastrointestinal:  Negative for abdominal pain, constipation, diarrhea, nausea and vomiting. Genitourinary:  Negative for flank pain. Neurological:  Negative for dizziness and headaches. BP (!) 145/95 (BP 1 Location: Right upper arm, BP Patient Position: Sitting, BP Cuff Size: Adult long)   Pulse 73   Temp 98.1 °F (36.7 °C) (Temporal)   Wt 227 lb 3.2 oz (103.1 kg)   SpO2 98%   BMI 38.79 kg/m²     Physical Exam  Constitutional:       General: He is not in acute distress. Appearance: Normal appearance. Eyes:      General:         Right eye: Discharge (watery) present. Left eye: Discharge present. Extraocular Movements: Extraocular movements intact. Conjunctiva/sclera: Conjunctivae normal.      Pupils: Pupils are equal, round, and reactive to light. Cardiovascular:      Rate and Rhythm: Normal rate and regular rhythm. Pulses: Normal pulses. Heart sounds: Normal heart sounds. Pulmonary:      Effort: Pulmonary effort is normal.      Breath sounds: Normal breath sounds. Abdominal:      General: Bowel sounds are normal. There is no distension. Tenderness: There is no abdominal tenderness. Neurological:      Mental Status: He is alert and oriented to person, place, and time.

## 2022-10-14 NOTE — PROGRESS NOTES
Coordination of Care  1. Have you been to the ER, urgent care clinic since your last visit? Hospitalized since your last visit? No    2. Have you seen or consulted any other health care providers outside of the [address] [place] [address] since your last visit? Include any pap smears or colon screening. No    Does the patient need refills? NO    Learning Assessment Complete?  yes  Depression Screening complete in the past 12 months? yes

## 2022-10-14 NOTE — PROGRESS NOTES
Rosemary Red seen at d/c, full name and  verified, given After visit Summary and reviewed today's visit with patient along with instructions on when it is recommended to come back. Patient advised to keep a BP diary at home and bring next appt. Lab slip with orders completed and handed to patient as well as laboratory locations. A list of dental resources was given to the patient for him to establish care with a dental provider for dental care. Patient advised to have lab work completed as soon as possible. Instructions were reviewed and patient verbalized understanding.   Gina Smith RN

## 2022-10-15 LAB
25(OH)D3 SERPL-MCNC: 25.9 NG/ML (ref 30–100)
ANION GAP SERPL CALC-SCNC: 5 MMOL/L (ref 5–15)
BUN SERPL-MCNC: 17 MG/DL (ref 6–20)
BUN/CREAT SERPL: 14 (ref 12–20)
CALCIUM SERPL-MCNC: 9.3 MG/DL (ref 8.5–10.1)
CHLORIDE SERPL-SCNC: 103 MMOL/L (ref 97–108)
CO2 SERPL-SCNC: 28 MMOL/L (ref 21–32)
CREAT SERPL-MCNC: 1.19 MG/DL (ref 0.7–1.3)
GLUCOSE SERPL-MCNC: 106 MG/DL (ref 65–100)
POTASSIUM SERPL-SCNC: 4.5 MMOL/L (ref 3.5–5.1)
SODIUM SERPL-SCNC: 136 MMOL/L (ref 136–145)
T4 FREE SERPL-MCNC: 1.1 NG/DL (ref 0.8–1.5)
TSH SERPL DL<=0.05 MIU/L-ACNC: 1.57 UIU/ML (ref 0.36–3.74)

## 2022-10-15 PROCEDURE — 83520 IMMUNOASSAY QUANT NOS NONAB: CPT

## 2022-10-15 PROCEDURE — 36415 COLL VENOUS BLD VENIPUNCTURE: CPT

## 2022-10-15 PROCEDURE — 84443 ASSAY THYROID STIM HORMONE: CPT

## 2022-10-15 PROCEDURE — 82306 VITAMIN D 25 HYDROXY: CPT

## 2022-10-15 PROCEDURE — 84439 ASSAY OF FREE THYROXINE: CPT

## 2022-10-15 PROCEDURE — 80048 BASIC METABOLIC PNL TOTAL CA: CPT

## 2022-10-16 LAB — TSH RECEP AB SER-ACNC: 11.4 IU/L (ref 0–1.75)

## 2022-10-19 ENCOUNTER — VIRTUAL VISIT (OUTPATIENT)
Dept: FAMILY MEDICINE CLINIC | Age: 47
End: 2022-10-19

## 2022-10-19 DIAGNOSIS — E55.9 VITAMIN D DEFICIENCY: ICD-10-CM

## 2022-10-19 DIAGNOSIS — E05.00 GRAVES DISEASE: Primary | ICD-10-CM

## 2022-10-19 PROCEDURE — 99214 OFFICE O/P EST MOD 30 MIN: CPT | Performed by: INTERNAL MEDICINE

## 2022-10-19 RX ORDER — AMLODIPINE BESYLATE 2.5 MG/1
2.5 TABLET ORAL DAILY
Qty: 180 TABLET | Refills: 1
Start: 2022-10-19

## 2022-10-19 NOTE — PROGRESS NOTES
Chief Complaint   Patient presents with    Hyperthyroidism       **THIS IS A VIRTUAL VISIT VIA A VIDEO SYNCHRONOUS DISCUSSION. PATIENT AGREED TO HAVE THEIR CARE DELIVERED OVER A DOXY. ME VIDEO VISIT IN PLACE OF THEIR REGULARLY SCHEDULED OFFICE VISIT**    History of Present Illness: Ines Sellers is a 52 y.o. male here for follow up of thyroid. Compliant with MMI 10 mg 4x/week. No chest pain or palpitations or tremors. Overall energy is good. Has been taking 2000 units of vitamin D daily. Has only been taking 1 tab of amlodipine daily as he felt worse when taking 2 tabs and is taking 50 mg of toprol XL 1 tab daily or 1/2 of the 100 mg tabs as he has both and BP is controlled at home on this dose. Current Outpatient Medications   Medication Sig    amLODIPine (NORVASC) 2.5 mg tablet Take 1 Tablet by mouth daily. Dose change 10/19/22--updated med list--did not send prescription to the pharmacy  Indications: high blood pressure    cholecalciferol (VITAMIN D3) (2,000 UNITS /50 MCG) cap capsule Take 2,000 Units by mouth in the morning. methIMAzole (TAPAZOLE) 10 mg tablet TAKE 1 TABLET BY MOUTH 4 TIMES A WEEK AND SKIP SUNDAY AND Wednesday AND Friday--Dose change 02/02/22--updated med list--did not send prescription to the pharmacy    metoprolol succinate (TOPROL-XL) 100 mg tablet Take 0.5 Tablets by mouth daily. No current facility-administered medications for this visit. Allergies   Allergen Reactions    Celebrex [Celecoxib] Other (comments)     Patient reports his body has felt \"hot\", burning sensation in his eyes, and throat has been dry.      Nabumetone Itching     Review of Systems: PER HPI    Physical Examination:  - GENERAL: NCAT, Appears well nourished   - EYES: EOMI, non-icteric, no proptosis   - Ear/Nose/Throat: NCAT, no visible inflammation or masses   - CARDIOVASCULAR: no cyanosis, no visible JVD   - RESPIRATORY: respiratory effort normal without any distress or labored breathing   - MUSCULOSKELETAL: Normal ROM of neck and upper extremities observed   - SKIN: No rash on face  - NEUROLOGIC:  No facial asymmetry (Cranial nerve 7 motor function), No gaze palsy   - PSYCHIATRIC: Normal affect, Normal insight and judgement       Data Reviewed:   Component      Latest Ref Rng & Units 10/15/2022   Sodium      136 - 145 mmol/L 136   Potassium      3.5 - 5.1 mmol/L 4.5   Chloride      97 - 108 mmol/L 103   CO2      21 - 32 mmol/L 28   Anion gap      5 - 15 mmol/L 5   Glucose      65 - 100 mg/dL 106 (H)   BUN      6 - 20 MG/DL 17   Creatinine      0.70 - 1.30 MG/DL 1.19   BUN/Creatinine ratio      12 - 20   14   eGFR      >60 ml/min/1.73m2 >60   Calcium      8.5 - 10.1 MG/DL 9.3   Vitamin D 25-Hydroxy      30 - 100 ng/mL 25.9 (L)   TSH      0.36 - 3.74 uIU/mL 1.57   T4, Free      0.8 - 1.5 NG/DL 1.1   Thyrotropin Receptor Ab, serum      0.00 - 1.75 IU/L 11.40 (H)       Assessment/Plan:     1. Grave's disease: Went to [facility] on 1/26/20 and TSH was <0.006 and was given Toprol XL 50 mg daily and then saw Dr. Felix El on 2/6/20 and his TSH was <0.01, FT4 was high at 3.6 and T3 was high at 338 and TPO ab was normal at 8 and TG ab was <1.0. He had a thyroid ultrasound that showed enlarged heterogeneous hypervascular thyroid gland without discrete nodule. Prominent bilateral cervical lymph nodes. His Toprol XL was increased to 100 mg daily. He returned to see Dr. Felix El on 2/20/20 to review his lab results and was prescribed methimazole 5 mg bid but was told to start with just 1 tab daily and see how he felt so he has just been taking this dose. Given his FT4 and T3 levels are about 2x normal, I think he would benefit from taking 10 mg of methimazole daily to more quickly normalize his levels so asked him to go up to this dose until he comes back to see me in 6 weeks. Will keep him on Toprol XL for now but hopefully can taper off this in the future once his thyroid is better controlled. Repeat labs showed TSH < 0.01, FT4 3.5, FT3 13.5 and TRAb 29.7 in 4/20 confirming he has Grave's disease. Increased his dose to 15 mg for the next 2 months and TSH < 0.01, FT4 2.0 and T3 248 in 6/20 so increased to 20 mg daily. TSH 0.01 and FT4 normal at 1.2 and TRAb 20.8 in 9/20 so kept dose the same until next visit. TSH up to 6.09 in 11/20 so decreased to 10 mg daily and tried to taper off the Toprol XL but felt worse off toprol so he resumed. TSH 2.16 and TRAb 16.8 in 1/21 so decreased to 10 mg 6x/week and TSH 0.62 in 4/21 so decreased to 10 mg 5x/week and TSH 0.82 and TRAb 11.6 in 11/21 so kept dose the same. TSH 1.76 and TRAb 10.8 in 1/22 so decreased to 10 mg 4x/week and TSH 0.54 and TRAb 10.2 in 6/22 and TSH 1.57 and TRAb 11.4 in 10/22 so kept dose the same  - cont methimazole 10 mg 4x/week  - cont Toprol XL 50 mg daily at bedtime  - check TSH and free T4 prior to next visit  - check TSH receptor ab prior to next visit         2. Vitamin D deficiency: Has had some pains in his legs and previously recalls taking weekly vitamin D and felt much better on this so advised him to take 2000 units daily in 6/22 and level was 25 in 10/22 so increased to 4000 units daily  - increase vitamin D 2000 units to 2 tabs daily  - check Vitamin D 25-OH level and bmp prior to next visit            Follow-up and Dispositions    Return 2/15/23 at [place] [address], was evaluated through a synchronous (real-time) audio-video encounter. The patient (or guardian if applicable) is aware that this is a billable service, which includes applicable co-pays. This Virtual Visit was conducted with patient's (and/or legal guardian's) consent. The visit was conducted pursuant to the emergency declaration under the [address] [place] [address], [address] [place] [address] authority and the [employer] Act.   Patient identification was verified, and a caregiver was present when appropriate. The patient was located at: Home: [address] [place] [id number]  The provider was located at: Facility (Appt Department): [address] [place] [address] Paige Martines MD on 10/19/2022 at 1:56 PM    An electronic signature was used to authenticate this note.

## 2022-10-19 NOTE — PROGRESS NOTES
Coordination of Care  1. Have you been to the ER, urgent care clinic since your last visit? Hospitalized since your last visit? No    2. Have you seen or consulted any other health care providers outside of the [address] [place] [address] since your last visit? Include any pap smears or colon screening. No    Does the patient need refills? NO    Learning Assessment Complete?  yes    Depression Screening complete in the past 12 months? yes

## 2022-10-19 NOTE — PROGRESS NOTES
AVS printed and mailed to patient's address of file since this was a virtual visit. Follow-up appointment with Dr. Umer Galo was scheduled for Wed, 2/15/23 at 2:00pm.  Patient will be contacted a few weeks prior to visit to schedule lab appointment for bloodwork. PRIMARY CARE VISIT        Patient name : Maikel Holcomb   MRN number: [id number]   YOB: 1964       Reason for visit:   Chief Complaint   Patient presents with   • Video Visit   • Back Pain     Back pain follow up             History of Present Illness: Still has pain both sides lower back radiates to right leg. No tingling or numbness.   Had 9 session of PT.  Saw pain specialist. Had one Epidural last week. Scheduled for F/U for next week.    Using C-PAP and tolerating well   Asthma doing well.  Taking BP meds and tolerating well.  Trying to loose weight.    Review of Systems:  Rest of the system reviews are negative.      Allergies  ALLERGIES:  No Known Allergies    Current Meds    Current Outpatient Medications   Medication Sig Dispense Refill   • lisinopril-hydroCHLOROthiazide (ZESTORETIC) 20-12.5 MG per tablet Take 1 tablet by mouth daily. 90 tablet 0   • amitriptyline (ELAVIL) 25 MG tablet TAKE 1 TABLET BY MOUTH EVERYDAY AT BEDTIME     • azelastine (OPTIVAR) 0.05 % ophthalmic solution Apply 1 drop to eye.     • methylPREDNISolone (MEDROL DOSEPAK) 4 MG tablet follow package directions take   With  Food 21 tablet 0   • meloxicam (MOBIC) 7.5 MG tablet Take 1 tablet by mouth 2 times daily as needed for Pain (take   with  food). 60 tablet 1   • albuterol (ACCUNEB) 0.63 MG/3ML nebulizer solution Take 3 mLs by nebulization every 6 hours as needed for Wheezing. 75 mL 1   • albuterol (ProAir HFA) 108 (90 Base) MCG/ACT inhaler Inhale 2 puffs into the lungs every 4 hours as needed for Shortness of Breath or Wheezing. 1 each 3   • baclofen (LIORESAL) 20 MG tablet Take 1 tablet by mouth at bedtime as needed (muscle spasm and pain). 30 tablet 1   • fluticasone propionate (Flovent HFA) 110 MCG/ACT inhaler Inhale 1 puff into the lungs 2 times daily. 12 g 12   • gabapentin (NEURONTIN) 300 MG capsule Take 1 capsule by mouth 3 times daily. 90 capsule 1   • Vitamin D, Ergocalciferol, 1.25 mg (50,000 units) capsule  Take 1 capsule by mouth 1 day a week. TAKE 1 CAPSULE BY MOUTH WEEKLY AFTER COMPLETING PRESCRIPTION. TAKE CALCIUM 1200MG & VITAMIN D3 800-1200 IU OVER THE COUNTER DAILY. 12 capsule 0   • SUMAtriptan (IMITREX) 50 MG tablet Take 1 tablet by mouth at onset of migraine. May repeat after 2 hours if needed. 9 tablet 3   • albuterol (Ventolin HFA) 108 (90 Base) MCG/ACT inhaler Inhale 2 puffs into the lungs every 4 hours as needed for Shortness of Breath or Wheezing. 1 each 0   • ondansetron (Zofran) 4 MG tablet Take 1 tablet by mouth every 8 hours as needed for Nausea. 60 tablet 0   • docusate sodium (Colace) 100 MG capsule Take 1 capsule by mouth 2 times daily as needed for Constipation. 20 capsule 0   • pantoprazole (PROTONIX) 40 MG tablet Take 1 tablet by mouth daily. 30 tablet 3   • Cholecalciferol (VITAMIN D) 50 mcg (2,000 units) tablet Take 1 tablet by mouth daily. 90 tablet 3   • clobetasol (TEMOVATE) 0.05 % ointment Apply topically 2 times daily. 30 g 1   • fexofenadine (ALLEGRA) 180 MG tablet Take 1 tablet by mouth daily. 30 tablet 1   • olopatadine (PATADAY) 0.2 % ophthalmic solution Place 1 drop into both eyes 2 times daily. 2.5 mL 1     No current facility-administered medications for this visit.     .       Past Medical History  Past Medical History:   Diagnosis Date   • Cervical sprain    • Essential (primary) hypertension    • RAD (reactive airway disease)    • Urinary incontinence    • Vitamin D deficiency        Surgical History  History reviewed. No pertinent surgical history.    Family History  Family History   Problem Relation Age of Onset   • Cancer Mother        Social History  Social History     Tobacco Use   • Smoking status: Never Smoker   • Smokeless tobacco: Never Used   Vaping Use   • Vaping Use: never used   Substance Use Topics   • Alcohol use: No   • Drug use: No               Vitals    Visit Vitals  Ht 5' 5\" (1.651 m)   LMP 10/11/2019 (Exact Date)   BMI 42.63 kg/m²              Physical Exam:  A&Ox3. Not in distress. Moving around with discomfort in the back.   No cough or wheezing.  Visible HEENT- Normal.  Neck: Normal.   Neuro: No focal deficit.            Assessment/ PLAN: Encounter for screening colonoscopy    - OPEN ACCESS COLONOSCOPY; Future    Chronic bilateral low back pain with bilateral sciatica: Meds reviewed. CPM. F/U with pain clinic.       Moderate asthma without complication, unspecified whether persistent: controlled, compliant and mild intermittent.      continue present medications.      discussed asthma plan.      Copy of asthma action plan given to patient.      discussed asthma control test.      avoid triggers.       Benign essential hypertension: Continue Lisinopril/HCTZ. Monitor BP.       BMI 40.0-44.9, adult (CMS/[facility]): Weight goal reviewed. Diet and exercise discussed.         F/U in 2 weeks.        RETURN TO OFFICE  No follow-ups on file.        Lucian Field MD  08/24/22

## 2022-12-15 ENCOUNTER — OFFICE VISIT (OUTPATIENT)
Dept: FAMILY MEDICINE CLINIC | Age: 47
End: 2022-12-15

## 2022-12-15 VITALS
OXYGEN SATURATION: 99 % | HEART RATE: 74 BPM | SYSTOLIC BLOOD PRESSURE: 120 MMHG | WEIGHT: 225 LBS | BODY MASS INDEX: 38.41 KG/M2 | TEMPERATURE: 97.8 F | DIASTOLIC BLOOD PRESSURE: 80 MMHG

## 2022-12-15 DIAGNOSIS — E05.90 HYPERTHYROIDISM: ICD-10-CM

## 2022-12-15 DIAGNOSIS — I10 HYPERTENSION, UNSPECIFIED TYPE: Primary | ICD-10-CM

## 2022-12-15 DIAGNOSIS — E05.00 GRAVES DISEASE: ICD-10-CM

## 2022-12-15 DIAGNOSIS — E55.9 VITAMIN D DEFICIENCY: ICD-10-CM

## 2022-12-15 PROCEDURE — 3074F SYST BP LT 130 MM HG: CPT | Performed by: FAMILY MEDICINE

## 2022-12-15 PROCEDURE — 3078F DIAST BP <80 MM HG: CPT | Performed by: FAMILY MEDICINE

## 2022-12-15 PROCEDURE — 99213 OFFICE O/P EST LOW 20 MIN: CPT | Performed by: FAMILY MEDICINE

## 2022-12-15 RX ORDER — METOPROLOL SUCCINATE 100 MG/1
100 TABLET, EXTENDED RELEASE ORAL DAILY
Qty: 90 TABLET | Refills: 3
Start: 2022-12-15 | End: 2022-12-15

## 2022-12-15 RX ORDER — METOPROLOL SUCCINATE 100 MG/1
100 TABLET, EXTENDED RELEASE ORAL DAILY
Qty: 90 TABLET | Refills: 3 | Status: SHIPPED | OUTPATIENT
Start: 2022-12-15

## 2022-12-15 NOTE — PROGRESS NOTES
HISTORY OF PRESENT ILLNESS  Kusum Nathan is a 52 y.o. male. HPI  Patient states he is doing well, he takes one amlodipine a day. Patient also takes metoprolol 100 mg, he takes one tablet a day. Takes vitamin D 1 tablet and tapazole 10 mg  4 days of the week. Review of Systems   Respiratory:  Negative for cough, sputum production, shortness of breath and wheezing. Cardiovascular:  Negative for chest pain, palpitations and orthopnea. Gastrointestinal:  Negative for abdominal pain, diarrhea, heartburn, nausea and vomiting. Genitourinary:  Negative for dysuria, frequency and urgency. Musculoskeletal:  Negative for myalgias. Neurological:  Negative for headaches. Psychiatric/Behavioral:  Negative for depression, substance abuse and suicidal ideas. The patient is not nervous/anxious. /80   Pulse 74   Temp 97.8 °F (36.6 °C) (Temporal)   Wt 225 lb (102.1 kg)   SpO2 99%   BMI 38.41 kg/m²   Physical Exam  Constitutional:       General: He is not in acute distress. Appearance: He is not ill-appearing. HENT:      Right Ear: Tympanic membrane normal.      Left Ear: Tympanic membrane normal.   Eyes:      General:         Right eye: No discharge. Left eye: No discharge. Extraocular Movements: Extraocular movements intact. Conjunctiva/sclera: Conjunctivae normal.   Cardiovascular:      Rate and Rhythm: Normal rate and regular rhythm. Heart sounds: No murmur heard. Pulmonary:      Effort: Pulmonary effort is normal. No respiratory distress. Breath sounds: No wheezing or rhonchi. Abdominal:      General: Bowel sounds are normal. There is no distension. Palpations: Abdomen is soft. There is no mass. Musculoskeletal:         General: No swelling, tenderness or deformity. Right lower leg: No edema. Left lower leg: No edema. Neurological:      Mental Status: He is alert. ASSESSMENT and PLAN  Diagnoses and all orders for this visit:    1. Hypertension, unspecified type  -     metoprolol succinate (TOPROL-XL) 100 mg tablet; Take 1 Tablet by mouth daily. 2. Hyperthyroidism    3. Vitamin D deficiency    4.  Graves disease    52year old male with hypertension and graves disease, doing well  Blood pressure is at target,  patient had increased his metoprolol to 100 mg  We will send new refill  Has follow up with endocrinology February 2023

## 2022-12-15 NOTE — PROGRESS NOTES
Iraj Baron left before discharge. This RN contacted patient at phone number on file. Reviewed the AVS over the phone. Medication list and possible side effects reviewed. Teach back method was utilized to ensure patient accurately understands how to and when to take each medication. Opportunity for questions provided, patient verbalizes understanding. AVS will mailed to patient's address on file.

## 2022-12-15 NOTE — PROGRESS NOTES
Coordination of Care  1. Have you been to the ER, urgent care clinic since your last visit? Hospitalized since your last visit? No    2. Have you seen or consulted any other health care providers outside of the [address] [place] [address] since your last visit? Include any pap smears or colon screening. No    Does the patient need refills? NO    Learning Assessment Complete?  yes

## 2023-02-08 ENCOUNTER — LAB ONLY (OUTPATIENT)
Dept: FAMILY MEDICINE CLINIC | Age: 48
End: 2023-02-08

## 2023-02-08 ENCOUNTER — HOSPITAL ENCOUNTER (OUTPATIENT)
Dept: LAB | Age: 48
Discharge: HOME | End: 2023-02-08

## 2023-02-08 DIAGNOSIS — E05.00 GRAVES DISEASE: ICD-10-CM

## 2023-02-08 DIAGNOSIS — E55.9 VITAMIN D DEFICIENCY: ICD-10-CM

## 2023-02-08 PROCEDURE — 36415 COLL VENOUS BLD VENIPUNCTURE: CPT

## 2023-02-08 PROCEDURE — 82306 VITAMIN D 25 HYDROXY: CPT

## 2023-02-08 PROCEDURE — 84439 ASSAY OF FREE THYROXINE: CPT

## 2023-02-08 PROCEDURE — 84443 ASSAY THYROID STIM HORMONE: CPT

## 2023-02-08 PROCEDURE — 80048 BASIC METABOLIC PNL TOTAL CA: CPT

## 2023-02-08 PROCEDURE — 83520 IMMUNOASSAY QUANT NOS NONAB: CPT

## 2023-02-09 LAB
ANION GAP SERPL CALC-SCNC: 6 MMOL/L (ref 5–15)
BUN SERPL-MCNC: 16 MG/DL (ref 6–20)
BUN/CREAT SERPL: 16 (ref 12–20)
CALCIUM SERPL-MCNC: 8.6 MG/DL (ref 8.5–10.1)
CHLORIDE SERPL-SCNC: 103 MMOL/L (ref 97–108)
CO2 SERPL-SCNC: 28 MMOL/L (ref 21–32)
CREAT SERPL-MCNC: 0.97 MG/DL (ref 0.7–1.3)
GLUCOSE SERPL-MCNC: 102 MG/DL (ref 65–100)
POTASSIUM SERPL-SCNC: 3.9 MMOL/L (ref 3.5–5.1)
SODIUM SERPL-SCNC: 137 MMOL/L (ref 136–145)
TSH SERPL DL<=0.05 MIU/L-ACNC: 1.16 UIU/ML (ref 0.36–3.74)

## 2023-02-10 LAB
25(OH)D3 SERPL-MCNC: 25.9 NG/ML (ref 30–100)
T4 FREE SERPL-MCNC: 1.4 NG/DL (ref 0.8–1.5)
TSH RECEP AB SER-ACNC: 7.83 IU/L (ref 0–1.75)

## 2023-02-15 ENCOUNTER — OFFICE VISIT (OUTPATIENT)
Dept: FAMILY MEDICINE CLINIC | Age: 48
End: 2023-02-15

## 2023-02-15 VITALS
BODY MASS INDEX: 38.34 KG/M2 | WEIGHT: 224.6 LBS | SYSTOLIC BLOOD PRESSURE: 144 MMHG | OXYGEN SATURATION: 100 % | HEART RATE: 68 BPM | DIASTOLIC BLOOD PRESSURE: 100 MMHG

## 2023-02-15 DIAGNOSIS — E05.00 GRAVES DISEASE: Primary | ICD-10-CM

## 2023-02-15 DIAGNOSIS — I10 ESSENTIAL HYPERTENSION, BENIGN: ICD-10-CM

## 2023-02-15 DIAGNOSIS — E55.9 VITAMIN D DEFICIENCY: ICD-10-CM

## 2023-02-15 DIAGNOSIS — R73.02 IMPAIRED GLUCOSE TOLERANCE: ICD-10-CM

## 2023-02-15 PROCEDURE — 99214 OFFICE O/P EST MOD 30 MIN: CPT | Performed by: INTERNAL MEDICINE

## 2023-02-15 PROCEDURE — 3080F DIAST BP >= 90 MM HG: CPT | Performed by: INTERNAL MEDICINE

## 2023-02-15 PROCEDURE — 3077F SYST BP >= 140 MM HG: CPT | Performed by: INTERNAL MEDICINE

## 2023-02-15 RX ORDER — ERGOCALCIFEROL 1.25 MG/1
50000 CAPSULE ORAL
Qty: 13 CAPSULE | Refills: 3 | Status: SHIPPED | OUTPATIENT
Start: 2023-02-15

## 2023-02-15 RX ORDER — METOPROLOL SUCCINATE 100 MG/1
100 TABLET, EXTENDED RELEASE ORAL DAILY
Qty: 90 TABLET | Refills: 3 | Status: CANCELLED | OUTPATIENT
Start: 2023-02-15

## 2023-02-15 RX ORDER — METOPROLOL SUCCINATE 100 MG/1
150 TABLET, EXTENDED RELEASE ORAL
Qty: 135 TABLET | Refills: 3 | Status: SHIPPED | OUTPATIENT
Start: 2023-02-15

## 2023-02-15 NOTE — PROGRESS NOTES
Chief Complaint   Patient presents with    Thyroid Problem    Other     Vision issues     History of Present Illness: Salima Munoz is a 52 y.o. male here for follow up of thyroid. Weight down 4 lbs since last visit in person in 6/22. Compliant with MMI 10 mg 4x/week. No chest pain or palpitations. Has had some neck pain 1-2 times a month but no trouble swallowing. No heat or cold intolerance. Did try to take 2 of the 2000 units of D daily and has more heat in his body and more fatigue. Previously felt more energy and less pain on the ergocalciferol so will use this again. Bowels are regular depending on what he eats. He increased his toprol XL to 100 mg at bedtime about a month ago. Did try the amlodipine but felt a sense of doom when he took this so he stopped it. Has had some eye tearing at times but no double vision. Current Outpatient Medications   Medication Sig    metoprolol succinate (TOPROL-XL) 100 mg tablet Take 1 Tablet by mouth daily. cholecalciferol (VITAMIN D3) (2,000 UNITS /50 MCG) cap capsule Take 2,000 Units by mouth daily. methIMAzole (TAPAZOLE) 10 mg tablet TAKE 1 TABLET BY MOUTH 4 TIMES A WEEK AND SKIP SUNDAY AND Wednesday AND Friday--Dose change 02/02/22--updated med list--did not send prescription to the pharmacy     No current facility-administered medications for this visit. Allergies   Allergen Reactions    Celebrex [Celecoxib] Other (comments)     Patient reports his body has felt \"hot\", burning sensation in his eyes, and throat has been dry. Nabumetone Itching     Review of Systems: PER HPI    Physical Examination:  Blood pressure (!) 144/100, pulse 68, weight 224 lb 9.6 oz (101.9 kg), SpO2 100 %.   General: pleasant, no distress, good eye contact   Neck: small goiter, no carotid bruits  Cardiovascular: regular, normal rate, nl s1 and s2, no m/r/g   Respiratory: clear to auscultation bilaterally   Integumentary: skin is normal, no edema  Neurological: reflexes 2+ at biceps, no tremors  Psychiatric: normal mood and affect    Data Reviewed:   Component      Latest Ref Rng & Units 2/8/2023   Sodium      136 - 145 mmol/L 137   Potassium      3.5 - 5.1 mmol/L 3.9   Chloride      97 - 108 mmol/L 103   CO2      21 - 32 mmol/L 28   Anion gap      5 - 15 mmol/L 6   Glucose      65 - 100 mg/dL 102 (H)   BUN      6 - 20 MG/DL 16   Creatinine      0.70 - 1.30 MG/DL 0.97   BUN/Creatinine ratio      12 - 20   16   eGFR      >60 ml/min/1.73m2 >60   Calcium      8.5 - 10.1 MG/DL 8.6   Vitamin D 25-Hydroxy      30 - 100 ng/mL 25.9 (L)   T4, Free      0.8 - 1.5 NG/DL 1.4   TSH      0.36 - 3.74 uIU/mL 1.16   Thyrotropin Receptor Ab, serum      0.00 - 1.75 IU/L 7.83 (H)       Assessment/Plan:     1. Grave's disease: Went to [facility] on 1/26/20 and TSH was <0.006 and was given Toprol XL 50 mg daily and then saw Dr. Lul Regalado on 2/6/20 and his TSH was <0.01, FT4 was high at 3.6 and T3 was high at 338 and TPO ab was normal at 8 and TG ab was <1.0. He had a thyroid ultrasound that showed enlarged heterogeneous hypervascular thyroid gland without discrete nodule. Prominent bilateral cervical lymph nodes. His Toprol XL was increased to 100 mg daily. He returned to see Dr. Lul Regalado on 2/20/20 to review his lab results and was prescribed methimazole 5 mg bid but was told to start with just 1 tab daily and see how he felt so he has just been taking this dose. Given his FT4 and T3 levels are about 2x normal, I think he would benefit from taking 10 mg of methimazole daily to more quickly normalize his levels so asked him to go up to this dose until he comes back to see me in 6 weeks. Will keep him on Toprol XL for now but hopefully can taper off this in the future once his thyroid is better controlled. Repeat labs showed TSH < 0.01, FT4 3.5, FT3 13.5 and TRAb 29.7 in 4/20 confirming he has Grave's disease.   Increased his dose to 15 mg for the next 2 months and TSH < 0.01, FT4 2.0 and T3 248 in 6/20 so increased to 20 mg daily. TSH 0.01 and FT4 normal at 1.2 and TRAb 20.8 in 9/20 so kept dose the same until next visit. TSH up to 6.09 in 11/20 so decreased to 10 mg daily and tried to taper off the Toprol XL but felt worse off toprol so he resumed. TSH 2.16 and TRAb 16.8 in 1/21 so decreased to 10 mg 6x/week and TSH 0.62 in 4/21 so decreased to 10 mg 5x/week and TSH 0.82 and TRAb 11.6 in 11/21 so kept dose the same. TSH 1.76 and TRAb 10.8 in 1/22 so decreased to 10 mg 4x/week and TSH 0.54 and TRAb 10.2 in 6/22 and TSH 1.57 and TRAb 11.4 in 10/22 and TSH 1.16 and TRAb 7.83 in 2/23 so kept dose the same  - cont methimazole 10 mg 4x/week  - check TSH and free T4 prior to next visit  - check TSH receptor ab prior to next visit         2. Vitamin D deficiency: Has had some pains in his legs and previously recalls taking weekly vitamin D and felt much better on this so advised him to take 2000 units daily in 6/22 and level was 25 in 10/22 so increased to 4000 units daily but felt worse so went back to 2000 units daily and level still 25.9 in 2/23 so began ergo 50K units weekly  - begin Ergocalciferol 50,000 units weekly  - check Vitamin D 25-OH level and bmp prior to next visit        3. Essential hypertension, benign: BP above goal < 140/90 so increased metoprolol   - increase metoprolol XL to 100 mg 1.5 tabs at bedtime       4. Impaired glucose tolerance: fasting glucose 102 in 2/23. A1c was 5.5% in 6/22  - cut back on carbs  - check A1c and bmp prior to next visit            Patient Instructions   1) Sigue tomando methimazole 1 pastilla 4 veces por semana. 2) Carlee 1 y media pastilla de metoprolol antes de dormir. 3) Para vitamin D 2000 y carlee ergocalciferol (capsulo kandy) 1 pastilla por semana.         Follow-up and Dispositions    Return 7/5/23 at 2pm.

## 2023-02-15 NOTE — PATIENT INSTRUCTIONS
1) Sigue tomando methimazole 1 pastilla 4 veces por semana. 2) Carlee 1 y media pastilla de metoprolol antes de dormir. 3) Para vitamin D 2000 y carlee ergocalciferol (capsulo kandy) 1 pastilla por semana.

## 2023-02-15 NOTE — PROGRESS NOTES
Chief Complaint   Patient presents with    Thyroid Problem    Other     Vision issues     Visit Vitals  BP (!) 162/101 (BP 1 Location: Right arm, BP Patient Position: Sitting, BP Cuff Size: Adult)   Pulse 68   Wt 224 lb 9.6 oz (101.9 kg)   SpO2 100%   BMI 38.34 kg/m²

## 2023-02-15 NOTE — PROGRESS NOTES
VIKKIS printed and with the assistance of Slovak interpretor, Alejandra Enciso, reviewed with patient. Patient advised that follow-up with Dr. Aurora Ross will be 7/5 at 2:00pm.  Patient also advised that he would be contacted a few weeks prior to face to face to schedule lab appointment. Patient indicated understanding and appreciated the service today.

## 2023-04-26 RX ORDER — METHIMAZOLE 10 MG/1
TABLET ORAL
Qty: 52 TABLET | Refills: 3 | Status: SHIPPED | OUTPATIENT
Start: 2023-04-26

## 2023-04-26 NOTE — TELEPHONE ENCOUNTER
Chief Complaint   Patient presents with    Medication Refill     Methimazole 10 mg tablet . Patient last office visit 2/15/2023.   Anna Crawford LPN

## 2023-06-27 ENCOUNTER — HOSPITAL ENCOUNTER (OUTPATIENT)
Facility: HOSPITAL | Age: 48
Setting detail: SPECIMEN
Discharge: HOME | End: 2023-06-30

## 2023-06-27 ENCOUNTER — NURSE ONLY (OUTPATIENT)
Age: 48
End: 2023-06-27

## 2023-06-27 DIAGNOSIS — E05.00 THYROTOXICOSIS WITH DIFFUSE GOITER WITHOUT THYROTOXIC CRISIS OR STORM: ICD-10-CM

## 2023-06-27 DIAGNOSIS — E05.00 THYROTOXICOSIS WITH DIFFUSE GOITER WITHOUT THYROTOXIC CRISIS OR STORM: Primary | ICD-10-CM

## 2023-06-27 PROCEDURE — 82306 VITAMIN D 25 HYDROXY: CPT

## 2023-06-27 PROCEDURE — 84443 ASSAY THYROID STIM HORMONE: CPT

## 2023-06-27 PROCEDURE — 80048 BASIC METABOLIC PNL TOTAL CA: CPT

## 2023-06-27 PROCEDURE — 84439 ASSAY OF FREE THYROXINE: CPT

## 2023-06-27 PROCEDURE — 36415 COLL VENOUS BLD VENIPUNCTURE: CPT

## 2023-06-27 PROCEDURE — 83520 IMMUNOASSAY QUANT NOS NONAB: CPT

## 2023-06-27 PROCEDURE — 83036 HEMOGLOBIN GLYCOSYLATED A1C: CPT

## 2023-06-28 LAB
25(OH)D3 SERPL-MCNC: 32.7 NG/ML (ref 30–100)
ANION GAP SERPL CALC-SCNC: 7 MMOL/L (ref 5–15)
BUN SERPL-MCNC: 17 MG/DL (ref 6–20)
BUN/CREAT SERPL: 17 (ref 12–20)
CALCIUM SERPL-MCNC: 8.8 MG/DL (ref 8.5–10.1)
CHLORIDE SERPL-SCNC: 104 MMOL/L (ref 97–108)
CO2 SERPL-SCNC: 26 MMOL/L (ref 21–32)
CREAT SERPL-MCNC: 0.98 MG/DL (ref 0.7–1.3)
EST. AVERAGE GLUCOSE BLD GHB EST-MCNC: 100 MG/DL
GLUCOSE SERPL-MCNC: 116 MG/DL (ref 65–100)
HBA1C MFR BLD: 5.1 % (ref 4–5.6)
POTASSIUM SERPL-SCNC: 3.9 MMOL/L (ref 3.5–5.1)
SODIUM SERPL-SCNC: 137 MMOL/L (ref 136–145)
T4 FREE SERPL-MCNC: 1.1 NG/DL (ref 0.8–1.5)
TSH SERPL DL<=0.05 MIU/L-ACNC: 1.83 UIU/ML (ref 0.36–3.74)

## 2023-06-29 LAB — TSH RECEP AB SER-ACNC: 7.13 IU/L (ref 0–1.75)

## 2023-07-05 ENCOUNTER — OFFICE VISIT (OUTPATIENT)
Age: 48
End: 2023-07-05

## 2023-07-05 VITALS
SYSTOLIC BLOOD PRESSURE: 130 MMHG | TEMPERATURE: 98.3 F | OXYGEN SATURATION: 97 % | WEIGHT: 221.4 LBS | DIASTOLIC BLOOD PRESSURE: 78 MMHG | BODY MASS INDEX: 37.8 KG/M2 | HEART RATE: 80 BPM

## 2023-07-05 DIAGNOSIS — R73.02 IMPAIRED GLUCOSE TOLERANCE (ORAL): ICD-10-CM

## 2023-07-05 DIAGNOSIS — E55.9 VITAMIN D DEFICIENCY, UNSPECIFIED: ICD-10-CM

## 2023-07-05 DIAGNOSIS — I10 ESSENTIAL (PRIMARY) HYPERTENSION: ICD-10-CM

## 2023-07-05 DIAGNOSIS — E05.00 GRAVES DISEASE: Primary | ICD-10-CM

## 2023-07-05 PROCEDURE — 3078F DIAST BP <80 MM HG: CPT | Performed by: INTERNAL MEDICINE

## 2023-07-05 PROCEDURE — 3075F SYST BP GE 130 - 139MM HG: CPT | Performed by: INTERNAL MEDICINE

## 2023-07-05 PROCEDURE — 99214 OFFICE O/P EST MOD 30 MIN: CPT | Performed by: INTERNAL MEDICINE

## 2023-07-05 RX ORDER — ERGOCALCIFEROL 1.25 MG/1
50000 CAPSULE ORAL
Qty: 13 CAPSULE | Refills: 3 | Status: SHIPPED | OUTPATIENT
Start: 2023-07-05

## 2023-07-05 NOTE — PROGRESS NOTES
Name and  confirmed w/ patient. An After Visit Summary was provided and all discharge instructions were reviewed with the patient including: f/up appt and refills. Time for questions and answers provided, patient verbalized understanding. Patient discharged from clinic in stable condition.  details per consult note.

## 2023-07-05 NOTE — PATIENT INSTRUCTIONS
1) Alejandra TSH (Examen de tiroide) esta normal.  Sigue tomando methimazole 1 pastilla 4 devries por semana. 2) Stephenie lazaro nueve receta para vitamin D para rusty 1 pastilla cada semana.   Tiene refills para un ano.    3) Alejandra azucar esta un poquito alto ban alejandra A1c (examen de 3 meses de azucar) esta normal.    4) Alejandra presion esta normal y rinones esta normal.

## 2023-07-17 ENCOUNTER — OFFICE VISIT (OUTPATIENT)
Age: 48
End: 2023-07-17

## 2023-07-17 VITALS
TEMPERATURE: 97.2 F | BODY MASS INDEX: 38.41 KG/M2 | OXYGEN SATURATION: 96 % | SYSTOLIC BLOOD PRESSURE: 138 MMHG | WEIGHT: 225 LBS | HEART RATE: 79 BPM | DIASTOLIC BLOOD PRESSURE: 70 MMHG | RESPIRATION RATE: 16 BRPM | HEIGHT: 64 IN

## 2023-07-17 DIAGNOSIS — M25.561 CHRONIC PAIN OF RIGHT KNEE: Primary | ICD-10-CM

## 2023-07-17 DIAGNOSIS — M17.11 PRIMARY OSTEOARTHRITIS OF RIGHT KNEE: ICD-10-CM

## 2023-07-17 DIAGNOSIS — G89.29 CHRONIC PAIN OF RIGHT KNEE: Primary | ICD-10-CM

## 2023-07-17 DIAGNOSIS — S83.281D ACUTE LATERAL MENISCUS TEAR OF RIGHT KNEE, SUBSEQUENT ENCOUNTER: ICD-10-CM

## 2023-07-17 PROCEDURE — 3078F DIAST BP <80 MM HG: CPT | Performed by: FAMILY MEDICINE

## 2023-07-17 PROCEDURE — 99213 OFFICE O/P EST LOW 20 MIN: CPT | Performed by: FAMILY MEDICINE

## 2023-07-17 PROCEDURE — 3075F SYST BP GE 130 - 139MM HG: CPT | Performed by: FAMILY MEDICINE

## 2023-07-17 RX ORDER — ASPIRIN 81 MG/1
TABLET ORAL
COMMUNITY

## 2023-07-17 RX ORDER — MOMETASONE FUROATE 50 UG/1
SPRAY, METERED NASAL
COMMUNITY
Start: 2018-08-13

## 2023-07-17 RX ORDER — KETOTIFEN FUMARATE 0.35 MG/ML
SOLUTION/ DROPS OPHTHALMIC
COMMUNITY

## 2023-07-17 SDOH — ECONOMIC STABILITY: INCOME INSECURITY: HOW HARD IS IT FOR YOU TO PAY FOR THE VERY BASICS LIKE FOOD, HOUSING, MEDICAL CARE, AND HEATING?: NOT HARD AT ALL

## 2023-07-17 SDOH — ECONOMIC STABILITY: HOUSING INSECURITY
IN THE LAST 12 MONTHS, WAS THERE A TIME WHEN YOU DID NOT HAVE A STEADY PLACE TO SLEEP OR SLEPT IN A SHELTER (INCLUDING NOW)?: NO

## 2023-07-17 SDOH — ECONOMIC STABILITY: FOOD INSECURITY: WITHIN THE PAST 12 MONTHS, THE FOOD YOU BOUGHT JUST DIDN'T LAST AND YOU DIDN'T HAVE MONEY TO GET MORE.: NEVER TRUE

## 2023-07-17 SDOH — ECONOMIC STABILITY: FOOD INSECURITY: WITHIN THE PAST 12 MONTHS, YOU WORRIED THAT YOUR FOOD WOULD RUN OUT BEFORE YOU GOT MONEY TO BUY MORE.: NEVER TRUE

## 2023-07-17 ASSESSMENT — ENCOUNTER SYMPTOMS
WHEEZING: 0
COUGH: 0
VOMITING: 0
ABDOMINAL DISTENTION: 0
NAUSEA: 0
CONSTIPATION: 0
SHORTNESS OF BREATH: 0
COLOR CHANGE: 0
DIARRHEA: 0

## 2023-07-17 ASSESSMENT — PATIENT HEALTH QUESTIONNAIRE - PHQ9
SUM OF ALL RESPONSES TO PHQ QUESTIONS 1-9: 1
1. LITTLE INTEREST OR PLEASURE IN DOING THINGS: 0
SUM OF ALL RESPONSES TO PHQ9 QUESTIONS 1 & 2: 1
SUM OF ALL RESPONSES TO PHQ QUESTIONS 1-9: 1
2. FEELING DOWN, DEPRESSED OR HOPELESS: 1
SUM OF ALL RESPONSES TO PHQ QUESTIONS 1-9: 1
SUM OF ALL RESPONSES TO PHQ QUESTIONS 1-9: 1

## 2023-07-17 NOTE — PROGRESS NOTES
Yinka Hsu seen at d/c, full name and  verified. After Visit Summary provided and reviewed. Medication list and possible side effects reviewed. GoodRx coupon provided and explained how to use. Teach back method utilized to ensure patient accurately understands how to and when to take each medication. Patient was advised that he will be contacted by a [facility] Coordinator in regards to his Orthopedic Surgery referral. Patient made aware that [facility] has its own financial screening. Opportunity for questions provided, patient denies any questions.  Swathi Pantoja RN

## 2023-07-17 NOTE — PROGRESS NOTES
Chief Complaint   Patient presents with    Hypertension     Follow up     Knee Pain     Right knee     1. Have you been to the ER, urgent care clinic since your last visit? Hospitalized since your last visit? No    2. Have you seen or consulted any other health care providers outside of the [address] [place] [address] Avenue since your last visit? Include any pap smears or colon screening.  No

## 2023-07-25 ENCOUNTER — OFFICE VISIT (OUTPATIENT)
Age: 48
End: 2023-07-25

## 2023-07-25 DIAGNOSIS — Z71.89 COUNSELING AND COORDINATION OF CARE: Primary | ICD-10-CM

## 2023-07-25 PROCEDURE — 99080 SPECIAL REPORTS OR FORMS: CPT | Performed by: FAMILY MEDICINE

## 2023-07-25 NOTE — PROGRESS NOTES
AN financial screening is complete.  Patient has been informed he will receive a call from a representative from the AN program to schedule him with specialist.

## 2023-10-27 ENCOUNTER — HOSPITAL ENCOUNTER (OUTPATIENT)
Facility: HOSPITAL | Age: 48
Setting detail: SPECIMEN
Discharge: HOME | End: 2023-10-30

## 2023-10-27 ENCOUNTER — NURSE ONLY (OUTPATIENT)
Age: 48
End: 2023-10-27

## 2023-10-27 LAB
ALBUMIN SERPL-MCNC: 4.3 G/DL (ref 3.5–5)
ALBUMIN/GLOB SERPL: 1.3 (ref 1.1–2.2)
ALP SERPL-CCNC: 83 U/L (ref 45–117)
ALT SERPL-CCNC: 48 U/L (ref 12–78)
ANION GAP SERPL CALC-SCNC: 4 MMOL/L (ref 5–15)
AST SERPL-CCNC: 45 U/L (ref 15–37)
BILIRUB SERPL-MCNC: 0.5 MG/DL (ref 0.2–1)
BUN SERPL-MCNC: 16 MG/DL (ref 6–20)
BUN/CREAT SERPL: 16 (ref 12–20)
CALCIUM SERPL-MCNC: 9.3 MG/DL (ref 8.5–10.1)
CHLORIDE SERPL-SCNC: 104 MMOL/L (ref 97–108)
CO2 SERPL-SCNC: 29 MMOL/L (ref 21–32)
CREAT SERPL-MCNC: 1.01 MG/DL (ref 0.7–1.3)
GLOBULIN SER CALC-MCNC: 3.4 G/DL (ref 2–4)
GLUCOSE SERPL-MCNC: 119 MG/DL (ref 65–100)
POTASSIUM SERPL-SCNC: 4.5 MMOL/L (ref 3.5–5.1)
PROT SERPL-MCNC: 7.7 G/DL (ref 6.4–8.2)
SODIUM SERPL-SCNC: 137 MMOL/L (ref 136–145)
T4 FREE SERPL-MCNC: 1.2 NG/DL (ref 0.8–1.5)
TSH SERPL DL<=0.05 MIU/L-ACNC: 1.43 UIU/ML (ref 0.36–3.74)

## 2023-10-27 PROCEDURE — 84443 ASSAY THYROID STIM HORMONE: CPT

## 2023-10-27 PROCEDURE — 80053 COMPREHEN METABOLIC PANEL: CPT

## 2023-10-27 PROCEDURE — 82306 VITAMIN D 25 HYDROXY: CPT

## 2023-10-27 PROCEDURE — 83520 IMMUNOASSAY QUANT NOS NONAB: CPT

## 2023-10-27 PROCEDURE — 36415 COLL VENOUS BLD VENIPUNCTURE: CPT

## 2023-10-27 PROCEDURE — 84439 ASSAY OF FREE THYROXINE: CPT

## 2023-10-27 PROCEDURE — 83036 HEMOGLOBIN GLYCOSYLATED A1C: CPT

## 2023-10-27 NOTE — PROGRESS NOTES
Patient presented to clinic for lab only appt. Name and  verified. Labs collected : T4,VITD,TSH,THYROTROPIN RECEPTOR   Patient tolerated well.  Diana Figueroa

## 2023-10-28 LAB
25(OH)D3 SERPL-MCNC: 38.7 NG/ML (ref 30–100)
EST. AVERAGE GLUCOSE BLD GHB EST-MCNC: 105 MG/DL
HBA1C MFR BLD: 5.3 % (ref 4–5.6)

## 2023-10-29 LAB — TSH RECEP AB SER-ACNC: 7.43 IU/L (ref 0–1.75)

## 2023-11-01 ENCOUNTER — OFFICE VISIT (OUTPATIENT)
Age: 48
End: 2023-11-01

## 2023-11-01 VITALS
DIASTOLIC BLOOD PRESSURE: 86 MMHG | WEIGHT: 233 LBS | HEIGHT: 64 IN | SYSTOLIC BLOOD PRESSURE: 136 MMHG | BODY MASS INDEX: 39.78 KG/M2 | HEART RATE: 93 BPM | TEMPERATURE: 98.1 F | OXYGEN SATURATION: 96 %

## 2023-11-01 DIAGNOSIS — E55.9 VITAMIN D DEFICIENCY, UNSPECIFIED: ICD-10-CM

## 2023-11-01 DIAGNOSIS — I10 ESSENTIAL (PRIMARY) HYPERTENSION: ICD-10-CM

## 2023-11-01 DIAGNOSIS — R73.02 IMPAIRED GLUCOSE TOLERANCE (ORAL): ICD-10-CM

## 2023-11-01 DIAGNOSIS — E05.00 GRAVES DISEASE: Primary | ICD-10-CM

## 2023-11-01 PROCEDURE — 99214 OFFICE O/P EST MOD 30 MIN: CPT | Performed by: INTERNAL MEDICINE

## 2023-11-01 PROCEDURE — 3075F SYST BP GE 130 - 139MM HG: CPT | Performed by: INTERNAL MEDICINE

## 2023-11-01 PROCEDURE — 3079F DIAST BP 80-89 MM HG: CPT | Performed by: INTERNAL MEDICINE

## 2023-11-01 SDOH — ECONOMIC STABILITY: FOOD INSECURITY: WITHIN THE PAST 12 MONTHS, YOU WORRIED THAT YOUR FOOD WOULD RUN OUT BEFORE YOU GOT MONEY TO BUY MORE.: NEVER TRUE

## 2023-11-01 SDOH — ECONOMIC STABILITY: INCOME INSECURITY: HOW HARD IS IT FOR YOU TO PAY FOR THE VERY BASICS LIKE FOOD, HOUSING, MEDICAL CARE, AND HEATING?: SOMEWHAT HARD

## 2023-11-01 SDOH — ECONOMIC STABILITY: FOOD INSECURITY: WITHIN THE PAST 12 MONTHS, THE FOOD YOU BOUGHT JUST DIDN'T LAST AND YOU DIDN'T HAVE MONEY TO GET MORE.: NEVER TRUE

## 2023-11-01 ASSESSMENT — PATIENT HEALTH QUESTIONNAIRE - PHQ9
SUM OF ALL RESPONSES TO PHQ9 QUESTIONS 1 & 2: 0
2. FEELING DOWN, DEPRESSED OR HOPELESS: 0
SUM OF ALL RESPONSES TO PHQ QUESTIONS 1-9: 0
1. LITTLE INTEREST OR PLEASURE IN DOING THINGS: 0
SUM OF ALL RESPONSES TO PHQ QUESTIONS 1-9: 0

## 2023-11-01 NOTE — PROGRESS NOTES
Coordination of Care  1. Have you been to the ER, urgent care clinic since your last visit? Hospitalized since your last visit? no    2. Have you seen or consulted any other health care providers outside of the [address] [place] [address] since your last visit? Include any pap smears or colon screening. no    Does the patient need refills? yes    Learning Assessment Complete?  yes  Depression Screening complete in the past 12 months? yes

## 2023-11-01 NOTE — PROGRESS NOTES
Patient name and date of birth verified. Patient given an after visit summary, reviewed next scheduled appointment. Informed will be scheduled for labs only 1-2 weeks prior to next appointment. Patient verbalized understanding of all information given.   Dionne Zaidi LPN

## 2023-11-01 NOTE — PATIENT INSTRUCTIONS
1) Alejandra A1c (examen de 3 meses de azucar) esta normal ban esta un poquito mas alto que en Junio. 2) Trata de comer 45 gramos de carbohidrato o butch en alejandra landon. Rafaela puna esta 30 gramos. 3) Alejandra tiroide esta normal.  Sigue tomando methimazole 1 pastilla 4 veces al semana. 4) Alejandra presion esta normal y vitamin D esta normal.    5) [place] [phone] a cambiar [employer].

## 2024-03-11 ENCOUNTER — HOSPITAL ENCOUNTER (OUTPATIENT)
Facility: HOSPITAL | Age: 49
Setting detail: SPECIMEN
Discharge: HOME | End: 2024-03-14

## 2024-03-11 ENCOUNTER — NURSE ONLY (OUTPATIENT)
Age: 49
End: 2024-03-11

## 2024-03-11 DIAGNOSIS — I10 ESSENTIAL (PRIMARY) HYPERTENSION: ICD-10-CM

## 2024-03-11 DIAGNOSIS — R73.02 IMPAIRED GLUCOSE TOLERANCE (ORAL): ICD-10-CM

## 2024-03-11 DIAGNOSIS — E05.00 GRAVES DISEASE: ICD-10-CM

## 2024-03-11 DIAGNOSIS — E55.9 VITAMIN D DEFICIENCY, UNSPECIFIED: ICD-10-CM

## 2024-03-11 PROCEDURE — 83520 IMMUNOASSAY QUANT NOS NONAB: CPT

## 2024-03-11 PROCEDURE — 84443 ASSAY THYROID STIM HORMONE: CPT

## 2024-03-11 PROCEDURE — 36415 COLL VENOUS BLD VENIPUNCTURE: CPT

## 2024-03-11 PROCEDURE — 84439 ASSAY OF FREE THYROXINE: CPT

## 2024-03-11 PROCEDURE — 83735 ASSAY OF MAGNESIUM: CPT

## 2024-03-11 PROCEDURE — 86376 MICROSOMAL ANTIBODY EACH: CPT

## 2024-03-11 PROCEDURE — 80053 COMPREHEN METABOLIC PANEL: CPT

## 2024-03-11 PROCEDURE — 83036 HEMOGLOBIN GLYCOSYLATED A1C: CPT

## 2024-03-11 NOTE — PROGRESS NOTES
Patient presented to clinic for lab only appt. Name and  verified. Labs collected : CMP,A1C,Thyrotropin receptor antibody,Thyroid peroxidase antibody,TSH,T4,magnesium by Tammy OSEIPatient tolerated well.   Brea Matias,CMA

## 2024-03-12 LAB
ALBUMIN SERPL-MCNC: 4 G/DL (ref 3.5–5)
ALBUMIN/GLOB SERPL: 1.3 (ref 1.1–2.2)
ALP SERPL-CCNC: 108 U/L (ref 45–117)
ALT SERPL-CCNC: 46 U/L (ref 12–78)
ANION GAP SERPL CALC-SCNC: 5 MMOL/L (ref 5–15)
AST SERPL-CCNC: 22 U/L (ref 15–37)
BILIRUB SERPL-MCNC: 0.4 MG/DL (ref 0.2–1)
BUN SERPL-MCNC: 16 MG/DL (ref 6–20)
BUN/CREAT SERPL: 15 (ref 12–20)
CALCIUM SERPL-MCNC: 8.9 MG/DL (ref 8.5–10.1)
CHLORIDE SERPL-SCNC: 106 MMOL/L (ref 97–108)
CO2 SERPL-SCNC: 28 MMOL/L (ref 21–32)
CREAT SERPL-MCNC: 1.07 MG/DL (ref 0.7–1.3)
EST. AVERAGE GLUCOSE BLD GHB EST-MCNC: 114 MG/DL
GLOBULIN SER CALC-MCNC: 3.2 G/DL (ref 2–4)
GLUCOSE SERPL-MCNC: 113 MG/DL (ref 65–100)
HBA1C MFR BLD: 5.6 % (ref 4–5.6)
MAGNESIUM SERPL-MCNC: 2.3 MG/DL (ref 1.6–2.4)
POTASSIUM SERPL-SCNC: 4.4 MMOL/L (ref 3.5–5.1)
PROT SERPL-MCNC: 7.2 G/DL (ref 6.4–8.2)
SODIUM SERPL-SCNC: 139 MMOL/L (ref 136–145)
T4 FREE SERPL-MCNC: 1.1 NG/DL (ref 0.8–1.5)
TSH SERPL DL<=0.05 MIU/L-ACNC: 2.69 UIU/ML (ref 0.36–3.74)

## 2024-03-13 LAB — TSH RECEP AB SER-ACNC: 5.78 IU/L (ref 0–1.75)

## 2024-03-14 LAB — THYROPEROXIDASE AB SERPL-ACNC: 14 IU/ML (ref 0–34)

## 2024-03-20 ENCOUNTER — OFFICE VISIT (OUTPATIENT)
Age: 49
End: 2024-03-20

## 2024-03-20 VITALS
WEIGHT: 231.8 LBS | SYSTOLIC BLOOD PRESSURE: 131 MMHG | HEART RATE: 106 BPM | DIASTOLIC BLOOD PRESSURE: 81 MMHG | BODY MASS INDEX: 39.57 KG/M2 | TEMPERATURE: 98.1 F | OXYGEN SATURATION: 95 %

## 2024-03-20 DIAGNOSIS — E55.9 VITAMIN D DEFICIENCY, UNSPECIFIED: ICD-10-CM

## 2024-03-20 DIAGNOSIS — E05.00 GRAVES DISEASE: Primary | ICD-10-CM

## 2024-03-20 DIAGNOSIS — I10 ESSENTIAL (PRIMARY) HYPERTENSION: ICD-10-CM

## 2024-03-20 DIAGNOSIS — R73.02 IMPAIRED GLUCOSE TOLERANCE (ORAL): ICD-10-CM

## 2024-03-20 PROCEDURE — 3075F SYST BP GE 130 - 139MM HG: CPT | Performed by: INTERNAL MEDICINE

## 2024-03-20 PROCEDURE — 99214 OFFICE O/P EST MOD 30 MIN: CPT | Performed by: INTERNAL MEDICINE

## 2024-03-20 PROCEDURE — 3079F DIAST BP 80-89 MM HG: CPT | Performed by: INTERNAL MEDICINE

## 2024-03-20 RX ORDER — METHIMAZOLE 10 MG/1
TABLET ORAL
Qty: 40 TABLET | Refills: 3 | Status: SHIPPED | OUTPATIENT
Start: 2024-03-20

## 2024-03-20 RX ORDER — METOPROLOL SUCCINATE 100 MG/1
150 TABLET, EXTENDED RELEASE ORAL
Qty: 135 TABLET | Refills: 3 | Status: SHIPPED | OUTPATIENT
Start: 2024-03-20

## 2024-03-20 ASSESSMENT — PATIENT HEALTH QUESTIONNAIRE - PHQ9
2. FEELING DOWN, DEPRESSED OR HOPELESS: NOT AT ALL
SUM OF ALL RESPONSES TO PHQ QUESTIONS 1-9: 0
SUM OF ALL RESPONSES TO PHQ9 QUESTIONS 1 & 2: 0
SUM OF ALL RESPONSES TO PHQ QUESTIONS 1-9: 0
1. LITTLE INTEREST OR PLEASURE IN DOING THINGS: NOT AT ALL

## 2024-03-20 NOTE — PROGRESS NOTES
Chief Complaint   Patient presents with    Thyroid Problem     Sudden sharp pain on the neck     Abdominal Pain     Upper left abdominal pain      Patient name and birth verified.  Dannielle Lawrence LPN    Patient given an after visit summary, reviewed next scheduled appointment.  Patient will be notified to schedule for labs prior to next scheduled visit.  Dannielle Lawrence LPN

## 2024-03-20 NOTE — PATIENT INSTRUCTIONS
1) [place] [phone] a cambiar [employer] password.    2) Sow A1c (examen de 3 meses de azucar) esta normal 5.6% ban esta un poquito mas alto que 5.3% en Octubre.    3) Jyoti methimazole 10 mg 3 veces al semana Lunes, Miercoles, y Viernes.    4) Sow presion esta controlado.    5) Leatha rinoes y higado esta normal.

## 2024-03-20 NOTE — PROGRESS NOTES
Chief Complaint   Patient presents with    Thyroid Problem     Sudden sharp pain on the neck     Abdominal Pain     Upper left abdominal pain     /81 (Site: Right Upper Arm, Position: Sitting, Cuff Size: Large Adult)   Pulse (!) 106   Temp 98.1 °F (36.7 °C) (Temporal)   Wt 105.1 kg (231 lb 12.8 oz)   SpO2 95%   BMI 39.57 kg/m²      \"Have you been to the ER, urgent care clinic since your last visit?  Hospitalized since your last visit?\"    NO    “Have you seen or consulted any other health care providers outside of [facility] since your last visit?”    NO        “Have you had a colorectal cancer screening such as a colonoscopy/FIT/Cologuard?    NO    No colonoscopy on file  No cologuard on file  Date of last FIT: 7/21/2022   No flexible sigmoidoscopy on file     Austin Aguilera, RN     Click Here for Release of Records Request

## 2024-03-20 NOTE — PROGRESS NOTES
Chief Complaint   Patient presents with    Thyroid Problem     Sudden sharp pain on the neck     Abdominal Pain     Upper left abdominal pain      History of Present Illness: Yinka Appiah is a 48 y.o. male here for follow up of thyroid.  Weight down 2 lbs since last visit in 11/23.  Compliant with MMI 10mg 4x/week.  No chest pain or palpitations or tremors.  Does have improvement in leg pain despite not taking the magnesium.  Compliant with vitamin D once a week and metoprolol 1.5 tabs at bedtime.  Has noticed some intermittent neck pain near his thyroid.  Also has noticed some LUQ pain at times that feels numb to the touch.  Does have some acid reflux and drinks coffee and eats some spicy food so will try to work on his diet.      Current Outpatient Medications   Medication Sig    ergocalciferol (ERGOCALCIFEROL) 1.25 MG (64566 UT) capsule Take 1 capsule by mouth every 7 days    methIMAzole (TAPAZOLE) 10 MG tablet TAKE 1 TABLET BY MOUTH 4 TIMES A WEEK AND SKIP SUNDAY AND Wednesday AND Friday--Dose change 02/02/22--updated med list--did not send prescription to the pharmacy    metoprolol succinate (TOPROL XL) 100 MG extended release tablet Take 1.5 tablets by mouth nightly     No current facility-administered medications for this visit.     Allergies   Allergen Reactions    Amlodipine Other (See Comments)     Felt a sense of doom    Celecoxib Other (See Comments)     Patient reports his body has felt \"hot\", burning sensation in his eyes, and throat has been dry.       Nabumetone Itching     Other reaction(s): itching/warmth       Review of Systems: PER HPI    Physical Examination:  Blood pressure 131/81, pulse (!) 106, temperature 98.1 °F (36.7 °C), temperature source Temporal, weight 105.1 kg (231 lb 12.8 oz), SpO2 95 %.  General: pleasant, no distress, good eye contact   Neck: small goiter  Cardiovascular: regular, normal rate, nl s1 and s2, no m/r/g   Respiratory: clear to auscultation bilaterally   Integumentary:  Statement Selected

## 2024-06-19 ENCOUNTER — TELEPHONE (OUTPATIENT)
Age: 49
End: 2024-06-19

## 2024-06-19 NOTE — TELEPHONE ENCOUNTER
Attempted to call patient x2 to schedule a lab appt for Dr. Samano before his appt on 7/3/24. Voicemail left with the [facility] callback.  [id number] assisted.

## 2024-06-24 ENCOUNTER — HOSPITAL ENCOUNTER (OUTPATIENT)
Facility: HOSPITAL | Age: 49
Setting detail: SPECIMEN
Discharge: HOME | End: 2024-06-27

## 2024-06-24 ENCOUNTER — NURSE ONLY (OUTPATIENT)
Age: 49
End: 2024-06-24

## 2024-06-24 DIAGNOSIS — E05.00 GRAVES DISEASE: ICD-10-CM

## 2024-06-24 DIAGNOSIS — E55.9 VITAMIN D DEFICIENCY, UNSPECIFIED: ICD-10-CM

## 2024-06-24 DIAGNOSIS — I10 ESSENTIAL (PRIMARY) HYPERTENSION: ICD-10-CM

## 2024-06-24 DIAGNOSIS — R73.02 IMPAIRED GLUCOSE TOLERANCE (ORAL): ICD-10-CM

## 2024-06-24 PROCEDURE — 80053 COMPREHEN METABOLIC PANEL: CPT

## 2024-06-24 PROCEDURE — 36415 COLL VENOUS BLD VENIPUNCTURE: CPT

## 2024-06-24 PROCEDURE — 84443 ASSAY THYROID STIM HORMONE: CPT

## 2024-06-24 PROCEDURE — 84439 ASSAY OF FREE THYROXINE: CPT

## 2024-06-24 PROCEDURE — 82306 VITAMIN D 25 HYDROXY: CPT

## 2024-06-24 PROCEDURE — 83520 IMMUNOASSAY QUANT NOS NONAB: CPT

## 2024-06-24 PROCEDURE — 83036 HEMOGLOBIN GLYCOSYLATED A1C: CPT

## 2024-06-25 LAB
25(OH)D3 SERPL-MCNC: 39.5 NG/ML (ref 30–100)
ALBUMIN SERPL-MCNC: 4.1 G/DL (ref 3.5–5)
ALBUMIN/GLOB SERPL: 1.3 (ref 1.1–2.2)
ALP SERPL-CCNC: 93 U/L (ref 45–117)
ALT SERPL-CCNC: 46 U/L (ref 12–78)
ANION GAP SERPL CALC-SCNC: 5 MMOL/L (ref 5–15)
AST SERPL-CCNC: 36 U/L (ref 15–37)
BILIRUB SERPL-MCNC: 0.6 MG/DL (ref 0.2–1)
BUN SERPL-MCNC: 20 MG/DL (ref 6–20)
BUN/CREAT SERPL: 22 (ref 12–20)
CALCIUM SERPL-MCNC: 8.8 MG/DL (ref 8.5–10.1)
CHLORIDE SERPL-SCNC: 104 MMOL/L (ref 97–108)
CO2 SERPL-SCNC: 26 MMOL/L (ref 21–32)
CREAT SERPL-MCNC: 0.93 MG/DL (ref 0.7–1.3)
EST. AVERAGE GLUCOSE BLD GHB EST-MCNC: 114 MG/DL
GLOBULIN SER CALC-MCNC: 3.2 G/DL (ref 2–4)
GLUCOSE SERPL-MCNC: 113 MG/DL (ref 65–100)
HBA1C MFR BLD: 5.6 % (ref 4–5.6)
POTASSIUM SERPL-SCNC: 4.1 MMOL/L (ref 3.5–5.1)
PROT SERPL-MCNC: 7.3 G/DL (ref 6.4–8.2)
SODIUM SERPL-SCNC: 135 MMOL/L (ref 136–145)
T4 FREE SERPL-MCNC: 1.2 NG/DL (ref 0.8–1.5)
TSH SERPL DL<=0.05 MIU/L-ACNC: 1.18 UIU/ML (ref 0.36–3.74)

## 2024-06-26 LAB — TSH RECEP AB SER-ACNC: 5.91 IU/L (ref 0–1.75)

## 2024-07-03 ENCOUNTER — TELEPHONE (OUTPATIENT)
Age: 49
End: 2024-07-03

## 2024-07-03 ENCOUNTER — OFFICE VISIT (OUTPATIENT)
Age: 49
End: 2024-07-03

## 2024-07-03 VITALS
TEMPERATURE: 99.1 F | DIASTOLIC BLOOD PRESSURE: 90 MMHG | SYSTOLIC BLOOD PRESSURE: 138 MMHG | OXYGEN SATURATION: 95 % | RESPIRATION RATE: 96 BRPM | HEART RATE: 87 BPM | WEIGHT: 230 LBS | BODY MASS INDEX: 39.27 KG/M2

## 2024-07-03 DIAGNOSIS — E55.9 VITAMIN D DEFICIENCY, UNSPECIFIED: ICD-10-CM

## 2024-07-03 DIAGNOSIS — I10 ESSENTIAL (PRIMARY) HYPERTENSION: ICD-10-CM

## 2024-07-03 DIAGNOSIS — R73.02 IMPAIRED GLUCOSE TOLERANCE (ORAL): ICD-10-CM

## 2024-07-03 DIAGNOSIS — E05.00 GRAVES DISEASE: Primary | ICD-10-CM

## 2024-07-03 PROCEDURE — 3080F DIAST BP >= 90 MM HG: CPT | Performed by: INTERNAL MEDICINE

## 2024-07-03 PROCEDURE — 99214 OFFICE O/P EST MOD 30 MIN: CPT | Performed by: INTERNAL MEDICINE

## 2024-07-03 PROCEDURE — 3075F SYST BP GE 130 - 139MM HG: CPT | Performed by: INTERNAL MEDICINE

## 2024-07-03 RX ORDER — ERGOCALCIFEROL 1.25 MG/1
50000 CAPSULE ORAL
Qty: 13 CAPSULE | Refills: 3 | Status: SHIPPED | OUTPATIENT
Start: 2024-07-03

## 2024-07-03 ASSESSMENT — PATIENT HEALTH QUESTIONNAIRE - PHQ9
2. FEELING DOWN, DEPRESSED OR HOPELESS: NOT AT ALL
SUM OF ALL RESPONSES TO PHQ QUESTIONS 1-9: 0
SUM OF ALL RESPONSES TO PHQ9 QUESTIONS 1 & 2: 0
1. LITTLE INTEREST OR PLEASURE IN DOING THINGS: NOT AT ALL
SUM OF ALL RESPONSES TO PHQ QUESTIONS 1-9: 0

## 2024-07-03 NOTE — TELEPHONE ENCOUNTER
Tammy,    Since there was confusion as to whether this patient truly has insurance or not and he has not received any card or letter confirming that he has insurance, I went ahead and made him a follow up appointment with me at the [facility] for November.  Can you please clarify if he truly has insurance and if so, I told him I will arrange for a follow up visit with me at [facility] for November.  Thanks!

## 2024-07-03 NOTE — PATIENT INSTRUCTIONS
1) Alejandra TSH (Examen de tiroides) esta normal ban alejandra anticuerpo de tiroides ya esta alto.  Entonces sigue tomando methimazole 10 mg 3 veces al semana.    2) Alejandra vitamin D esta normal.  Sigue tomando ergocalciferol 50,000 unidades 1 pastilla cada semana.  Derek lazaro nueva receta a [facility] para un ano.    3) Alejandra A1c (examen de azucar de 3 meses) esta normal ban alejandra azucar la pasha de los examenes estaba un poquito alto.    4) Leatha rinones y higado esta normal.      5) La enfermera va a llamar para determinar si usted tiene seguro y si tiene seguro vamos a cambiar alejandra sharyn a mi oficina en [place].  [address]  [place] [address] in the [facility] ([facility])

## 2024-07-03 NOTE — PROGRESS NOTES
Chief Complaint   Patient presents with    Thyroid Problem     History of Present Illness: Yinka Appiah is a 48 y.o. male here for follow up of thyroid.  Weight down 1 lbs since last visit in 3/24.  No chest pain or palpitations or tremors. Bowels are regular.  No heat or cold intolerance.  Overall energy is good taking methimazole 10 mg 3x/week.  There are times when he notices some pain in his neck for 30-60 minutes on the days he takes the methimazole but this doesn't occur every time he takes the methimazole and also can feel like his neck is harder.  Taking vitamin D every week and metoprolol 1.5 tabs daily.  There was question of whether he has insurance at this time and states someone called his wife about this and he may have Medicaid but he doesn't have a card so we will sort this out next week to determine if he should be seen in my private clinic or not and he prefers to come to [facility].      Current Outpatient Medications   Medication Sig    methIMAzole (TAPAZOLE) 10 MG tablet Take 1 tab 3x/week on Mon, Wed, and Fri    metoprolol succinate (TOPROL XL) 100 MG extended release tablet Take 1.5 tablets by mouth nightly    ergocalciferol (ERGOCALCIFEROL) 1.25 MG (69992 UT) capsule Take 1 capsule by mouth every 7 days     No current facility-administered medications for this visit.     Allergies   Allergen Reactions    Amlodipine Other (See Comments)     Felt a sense of doom    Celecoxib Other (See Comments)     Patient reports his body has felt \"hot\", burning sensation in his eyes, and throat has been dry.       Nabumetone Itching     Other reaction(s): itching/warmth       Review of Systems: PER HPI    Physical Examination:  Blood pressure (!) 138/90, pulse 87, temperature 99.1 °F (37.3 °C), temperature source Temporal, resp. rate (!) 96, weight 104.3 kg (230 lb), SpO2 95 %.  General: pleasant, no distress, good eye contact   Neck: small goiter  Cardiovascular: regular, normal rate, nl s1 and s2, no m/r/g

## 2024-07-03 NOTE — PROGRESS NOTES
Chief Complaint   Patient presents with    Thyroid Problem     BP (!) 138/90 (Site: Left Upper Arm, Position: Sitting, Cuff Size: Medium Adult)   Pulse 87   Temp 99.1 °F (37.3 °C) (Temporal)   Resp (!) 96   Wt 104.3 kg (230 lb)   SpO2 95%   BMI 39.27 kg/m²   \"Have you been to the ER, urgent care clinic since your last visit?  Hospitalized since your last visit?\"    NO    “Have you seen or consulted any other health care providers outside of [facility] since your last visit?”    NO        “Have you had a colorectal cancer screening such as a colonoscopy/FIT/Cologuard?     See below   No colonoscopy on file  No cologuard on file  Date of last FIT: 7/21/2022   No flexible sigmoidoscopy on file         Click Here for Release of Records Request

## 2024-07-03 NOTE — PROGRESS NOTES
Yinka Appiah seen at d/c, full name and  verified, given After visit Summary and reviewed today's visit with patient along with instructions on when it is recommended to come back.

## 2024-07-09 NOTE — TELEPHONE ENCOUNTER
Hi Dr. Samano!     Per Julieth, we could not find this patient in the Medicaid system which suggests that he may have prescription-only coverage. I believe that means he can stay within the [facility] for his medical needs. Please let me know if there's anything else we can do.

## 2024-07-23 NOTE — TELEPHONE ENCOUNTER
I spoke with Yinka and let him know that he will still be with Dr. Samano in the fall. Patient confirmed understanding, no further questions at this time.     [place] AMN  [id number] assisted with interpretation.

## 2024-10-09 ENCOUNTER — TELEPHONE (OUTPATIENT)
Age: 49
End: 2024-10-09

## 2024-10-17 ENCOUNTER — HOSPITAL ENCOUNTER (OUTPATIENT)
Facility: HOSPITAL | Age: 49
Setting detail: SPECIMEN
Discharge: HOME | End: 2024-10-20

## 2024-10-17 DIAGNOSIS — R73.02 IMPAIRED GLUCOSE TOLERANCE (ORAL): ICD-10-CM

## 2024-10-17 DIAGNOSIS — E05.00 GRAVES DISEASE: ICD-10-CM

## 2024-10-17 DIAGNOSIS — I10 ESSENTIAL (PRIMARY) HYPERTENSION: ICD-10-CM

## 2024-10-17 DIAGNOSIS — E55.9 VITAMIN D DEFICIENCY, UNSPECIFIED: ICD-10-CM

## 2024-10-17 LAB
EST. AVERAGE GLUCOSE BLD GHB EST-MCNC: 108 MG/DL
HBA1C MFR BLD: 5.4 % (ref 4–5.6)

## 2024-10-17 PROCEDURE — 80048 BASIC METABOLIC PNL TOTAL CA: CPT

## 2024-10-17 PROCEDURE — 84443 ASSAY THYROID STIM HORMONE: CPT

## 2024-10-17 PROCEDURE — 83520 IMMUNOASSAY QUANT NOS NONAB: CPT

## 2024-10-17 PROCEDURE — 83036 HEMOGLOBIN GLYCOSYLATED A1C: CPT

## 2024-10-17 PROCEDURE — 84439 ASSAY OF FREE THYROXINE: CPT

## 2024-10-18 LAB
ANION GAP SERPL CALC-SCNC: 8 MMOL/L (ref 2–12)
BUN SERPL-MCNC: 19 MG/DL (ref 6–20)
BUN/CREAT SERPL: 19 (ref 12–20)
CALCIUM SERPL-MCNC: 9.3 MG/DL (ref 8.5–10.1)
CHLORIDE SERPL-SCNC: 102 MMOL/L (ref 97–108)
CO2 SERPL-SCNC: 28 MMOL/L (ref 21–32)
CREAT SERPL-MCNC: 1.02 MG/DL (ref 0.7–1.3)
GLUCOSE SERPL-MCNC: 113 MG/DL (ref 65–100)
POTASSIUM SERPL-SCNC: 4.3 MMOL/L (ref 3.5–5.1)
SODIUM SERPL-SCNC: 138 MMOL/L (ref 136–145)
T4 FREE SERPL-MCNC: 1.1 NG/DL (ref 0.8–1.5)
TSH SERPL DL<=0.05 MIU/L-ACNC: 1.55 UIU/ML (ref 0.36–3.74)

## 2024-10-19 LAB — TSH RECEP AB SER-ACNC: 6.53 IU/L (ref 0–1.75)

## 2024-11-06 ENCOUNTER — OFFICE VISIT (OUTPATIENT)
Age: 49
End: 2024-11-06

## 2024-11-06 VITALS
TEMPERATURE: 98.5 F | SYSTOLIC BLOOD PRESSURE: 132 MMHG | DIASTOLIC BLOOD PRESSURE: 92 MMHG | HEART RATE: 98 BPM | WEIGHT: 232.8 LBS | BODY MASS INDEX: 39.74 KG/M2 | OXYGEN SATURATION: 97 %

## 2024-11-06 DIAGNOSIS — E55.9 VITAMIN D DEFICIENCY, UNSPECIFIED: ICD-10-CM

## 2024-11-06 DIAGNOSIS — I10 ESSENTIAL (PRIMARY) HYPERTENSION: ICD-10-CM

## 2024-11-06 DIAGNOSIS — E05.00 GRAVES DISEASE: Primary | ICD-10-CM

## 2024-11-06 DIAGNOSIS — R73.02 IMPAIRED GLUCOSE TOLERANCE (ORAL): ICD-10-CM

## 2024-11-06 PROCEDURE — 3075F SYST BP GE 130 - 139MM HG: CPT | Performed by: INTERNAL MEDICINE

## 2024-11-06 PROCEDURE — 3080F DIAST BP >= 90 MM HG: CPT | Performed by: INTERNAL MEDICINE

## 2024-11-06 PROCEDURE — 99214 OFFICE O/P EST MOD 30 MIN: CPT | Performed by: INTERNAL MEDICINE

## 2024-11-06 ASSESSMENT — PATIENT HEALTH QUESTIONNAIRE - PHQ9
SUM OF ALL RESPONSES TO PHQ9 QUESTIONS 1 & 2: 0
SUM OF ALL RESPONSES TO PHQ QUESTIONS 1-9: 0
2. FEELING DOWN, DEPRESSED OR HOPELESS: NOT AT ALL
1. LITTLE INTEREST OR PLEASURE IN DOING THINGS: NOT AT ALL

## 2024-11-06 ASSESSMENT — SOCIAL DETERMINANTS OF HEALTH (SDOH)
WITHIN THE LAST YEAR, HAVE TO BEEN RAPED OR FORCED TO HAVE ANY KIND OF SEXUAL ACTIVITY BY YOUR PARTNER OR EX-PARTNER?: NO
WITHIN THE LAST YEAR, HAVE YOU BEEN HUMILIATED OR EMOTIONALLY ABUSED IN OTHER WAYS BY YOUR PARTNER OR EX-PARTNER?: NO
WITHIN THE LAST YEAR, HAVE YOU BEEN KICKED, HIT, SLAPPED, OR OTHERWISE PHYSICALLY HURT BY YOUR PARTNER OR EX-PARTNER?: NO
WITHIN THE LAST YEAR, HAVE YOU BEEN AFRAID OF YOUR PARTNER OR EX-PARTNER?: NO

## 2024-11-06 NOTE — PROGRESS NOTES
Chief Complaint   Patient presents with    Thyroid Problem     BP (!) 132/92 (Site: Left Upper Arm, Position: Sitting, Cuff Size: Large Adult)   Pulse 98   Temp 98.5 °F (36.9 °C) (Temporal)   Wt 105.6 kg (232 lb 12.8 oz)   SpO2 97%   BMI 39.74 kg/m²     \"Have you been to the ER, urgent care clinic since your last visit?  Hospitalized since your last visit?\"    NO    “Have you seen or consulted any other health care providers outside of [facility] since your last visit?”    NO        “Have you had a colorectal cancer screening such as a colonoscopy/FIT/Cologuard?    NO    No colonoscopy on file  No cologuard on file  Date of last FIT: 7/21/2022   No flexible sigmoidoscopy on file         Click Here for Release of Records Request

## 2024-11-06 NOTE — PROGRESS NOTES
Chief Complaint   Patient presents with    Thyroid Problem     History of Present Illness: Yinka Appiah is a 49 y.o. male here for follow up of thyroid.  Weight up 2 lbs since last visit in 7/24.  Compliant with methimazole, metoprolol and vitamin D.  No chest pain or palpitations.  His wife just turned 50 and he will turn 50 next year.  His son turned 18 and voted yesterday.  No tremors.  At times on days he takes the methimazole, can notice some discomfort over his thyroid but on non-methimazole days, this doesn't occur.      Current Outpatient Medications   Medication Sig    ergocalciferol (ERGOCALCIFEROL) 1.25 MG (14246 UT) capsule Take 1 capsule by mouth every 7 days    methIMAzole (TAPAZOLE) 10 MG tablet Take 1 tab 3x/week on Mon, Wed, and Fri    metoprolol succinate (TOPROL XL) 100 MG extended release tablet Take 1.5 tablets by mouth nightly     No current facility-administered medications for this visit.     Allergies   Allergen Reactions    Amlodipine Other (See Comments)     Felt a sense of doom    Celecoxib Other (See Comments)     Patient reports his body has felt \"hot\", burning sensation in his eyes, and throat has been dry.       Nabumetone Itching     Other reaction(s): itching/warmth       Review of Systems: PER HPI    Physical Examination:  Blood pressure (!) 132/92, pulse 98, temperature 98.5 °F (36.9 °C), temperature source Temporal, weight 105.6 kg (232 lb 12.8 oz), SpO2 97%.  General: pleasant, no distress, good eye contact   Neck: small goiter, no carotid bruits  Cardiovascular: regular, normal rate, nl s1 and s2, no m/r/g   Respiratory: clear to auscultation bilaterally   Integumentary: skin is normal, no edema  Neurological: reflexes 2+ at biceps, no tremors  Psychiatric: normal mood and affect    Data Reviewed:   Component      Latest Ref Rng 10/17/2024   Sodium      136 - 145 mmol/L 138    Potassium      3.5 - 5.1 mmol/L 4.3    Chloride      97 - 108 mmol/L 102    CARBON DIOXIDE      21 -

## 2024-11-06 NOTE — PROGRESS NOTES
Name and  confirmed w/ patient. An After Visit Summary was provided and all discharge instructions were reviewed with the patient including: f/up appt. Dental resources provided. Time for questions and answers provided, patient verbalized understanding. Patient discharged from clinic in stable condition.  details per consult note.

## 2024-11-06 NOTE — PATIENT INSTRUCTIONS
Component      Latest Ref Rng 6/24/2024 10/17/2024   Sodium      136 - 145 mmol/L 135 (L)  138    Potassium      3.5 - 5.1 mmol/L 4.1  4.3    Chloride      97 - 108 mmol/L 104  102    CARBON DIOXIDE      21 - 32 mmol/L 26  28    Anion Gap      2 - 12 mmol/L 5  8    Glucose      65 - 100 mg/dL 113 (H)  113 (H)    BUN,BUNPL      6 - 20 MG/DL 20  19    Creatinine      0.70 - 1.30 MG/DL 0.93  1.02    Bun/Cre      12 - 20   22 (H)  19    Est, Glom Filt Rate      >60 ml/min/1.73m2 >90  >90    Calcium      8.5 - 10.1 MG/DL 8.8  9.3    Total Bilirubin      0.2 - 1.0 MG/DL 0.6     ALT      12 - 78 U/L 46     AST      15 - 37 U/L 36     Alkaline Phosphatase      45 - 117 U/L 93     Total Protein      6.4 - 8.2 g/dL 7.3     Albumin      3.5 - 5.0 g/dL 4.1     Globulin      2.0 - 4.0 g/dL 3.2     Albumin/Globulin Ratio      1.1 - 2.2   1.3     Hemoglobin A1C      4.0 - 5.6 % 5.6  5.4    eAG (mg/dL)      mg/dL 114  108    Thyrotropin Receptor Ab      0.00 - 1.75 IU/L 5.91 (H)  6.53 (H)    TSH, 3rd Generation      0.36 - 3.74 uIU/mL 1.18  1.55    T4 Free      0.8 - 1.5 NG/DL 1.2  1.1    Vit D, 25-Hydroxy      30 - 100 ng/mL 39.5        1) Sow TSH (examen de tiroides) esta normal.  Sigue tomando methimazole 10 mg 3 veces al semana.    2) Sow A1c (Examen de azucar) esta normal.    3) BUN and creatinine esta normal (rinones).

## 2025-02-11 ENCOUNTER — TELEPHONE (OUTPATIENT)
Age: 50
End: 2025-02-11

## 2025-02-11 NOTE — TELEPHONE ENCOUNTER
Attempted to call patient x2 to schedule him for lab appt before his visit with Dr. Samano on 03/05/2025. Voicemail full at this time.

## 2025-03-04 ENCOUNTER — LAB (OUTPATIENT)
Age: 50
End: 2025-03-04

## 2025-03-04 ENCOUNTER — HOSPITAL ENCOUNTER (OUTPATIENT)
Facility: HOSPITAL | Age: 50
Setting detail: SPECIMEN
Discharge: HOME | End: 2025-03-07

## 2025-03-04 DIAGNOSIS — I10 ESSENTIAL (PRIMARY) HYPERTENSION: ICD-10-CM

## 2025-03-04 DIAGNOSIS — E55.9 VITAMIN D DEFICIENCY, UNSPECIFIED: ICD-10-CM

## 2025-03-04 DIAGNOSIS — E05.00 GRAVES DISEASE: ICD-10-CM

## 2025-03-04 DIAGNOSIS — R73.02 IMPAIRED GLUCOSE TOLERANCE (ORAL): ICD-10-CM

## 2025-03-04 LAB
ANION GAP SERPL CALC-SCNC: 8 MMOL/L (ref 2–12)
BUN SERPL-MCNC: 14 MG/DL (ref 6–20)
BUN/CREAT SERPL: 15 (ref 12–20)
CALCIUM SERPL-MCNC: 9.2 MG/DL (ref 8.5–10.1)
CHLORIDE SERPL-SCNC: 103 MMOL/L (ref 97–108)
CO2 SERPL-SCNC: 27 MMOL/L (ref 21–32)
CREAT SERPL-MCNC: 0.94 MG/DL (ref 0.7–1.3)
GLUCOSE SERPL-MCNC: 107 MG/DL (ref 65–100)
POTASSIUM SERPL-SCNC: 3.8 MMOL/L (ref 3.5–5.1)
SODIUM SERPL-SCNC: 138 MMOL/L (ref 136–145)
T4 FREE SERPL-MCNC: 1.3 NG/DL (ref 0.8–1.5)
TSH SERPL DL<=0.05 MIU/L-ACNC: 1.4 UIU/ML (ref 0.36–3.74)

## 2025-03-04 PROCEDURE — 83520 IMMUNOASSAY QUANT NOS NONAB: CPT

## 2025-03-04 PROCEDURE — 80048 BASIC METABOLIC PNL TOTAL CA: CPT

## 2025-03-04 PROCEDURE — 84439 ASSAY OF FREE THYROXINE: CPT

## 2025-03-04 PROCEDURE — 82306 VITAMIN D 25 HYDROXY: CPT

## 2025-03-04 PROCEDURE — 36415 COLL VENOUS BLD VENIPUNCTURE: CPT

## 2025-03-04 PROCEDURE — 83036 HEMOGLOBIN GLYCOSYLATED A1C: CPT

## 2025-03-04 PROCEDURE — 84443 ASSAY THYROID STIM HORMONE: CPT

## 2025-03-04 NOTE — PROGRESS NOTES
Patient presented to the clinic for a laboratory appointment. Name and  verified. Ordered labs collected from left AC without difficulty. Patient given an opportunity to voice questions/concerns. No questions/concerns at this time. Patient discharged in stable condition.

## 2025-03-05 ENCOUNTER — OFFICE VISIT (OUTPATIENT)
Age: 50
End: 2025-03-05

## 2025-03-05 VITALS
TEMPERATURE: 98.7 F | DIASTOLIC BLOOD PRESSURE: 83 MMHG | BODY MASS INDEX: 39.78 KG/M2 | OXYGEN SATURATION: 96 % | WEIGHT: 233 LBS | SYSTOLIC BLOOD PRESSURE: 135 MMHG | HEART RATE: 75 BPM

## 2025-03-05 DIAGNOSIS — R73.02 IMPAIRED GLUCOSE TOLERANCE (ORAL): ICD-10-CM

## 2025-03-05 DIAGNOSIS — E55.9 VITAMIN D DEFICIENCY, UNSPECIFIED: ICD-10-CM

## 2025-03-05 DIAGNOSIS — E05.00 GRAVES DISEASE: Primary | ICD-10-CM

## 2025-03-05 DIAGNOSIS — I10 ESSENTIAL (PRIMARY) HYPERTENSION: ICD-10-CM

## 2025-03-05 LAB
25(OH)D3 SERPL-MCNC: 40.5 NG/ML (ref 30–100)
EST. AVERAGE GLUCOSE BLD GHB EST-MCNC: 114 MG/DL
HBA1C MFR BLD: 5.6 % (ref 4–5.6)

## 2025-03-05 PROCEDURE — 99214 OFFICE O/P EST MOD 30 MIN: CPT | Performed by: INTERNAL MEDICINE

## 2025-03-05 PROCEDURE — 3075F SYST BP GE 130 - 139MM HG: CPT | Performed by: INTERNAL MEDICINE

## 2025-03-05 PROCEDURE — 3079F DIAST BP 80-89 MM HG: CPT | Performed by: INTERNAL MEDICINE

## 2025-03-05 RX ORDER — METOPROLOL SUCCINATE 100 MG/1
150 TABLET, EXTENDED RELEASE ORAL
Qty: 135 TABLET | Refills: 3 | Status: SHIPPED | OUTPATIENT
Start: 2025-03-05

## 2025-03-05 RX ORDER — METHIMAZOLE 10 MG/1
TABLET ORAL
Qty: 40 TABLET | Refills: 3 | Status: SHIPPED | OUTPATIENT
Start: 2025-03-05

## 2025-03-05 SDOH — SOCIAL STABILITY: SOCIAL NETWORK
IN A TYPICAL WEEK, HOW MANY TIMES DO YOU TALK ON THE PHONE WITH FAMILY, FRIENDS, OR NEIGHBORS?: MORE THAN THREE TIMES A WEEK

## 2025-03-05 SDOH — HEALTH STABILITY: PHYSICAL HEALTH: ON AVERAGE, HOW MANY MINUTES DO YOU ENGAGE IN EXERCISE AT THIS LEVEL?: 0 MIN

## 2025-03-05 SDOH — SOCIAL STABILITY: SOCIAL NETWORK
DO YOU BELONG TO ANY CLUBS OR ORGANIZATIONS SUCH AS CHURCH GROUPS, UNIONS, FRATERNAL OR ATHLETIC GROUPS, OR SCHOOL GROUPS?: NO

## 2025-03-05 SDOH — SOCIAL STABILITY: SOCIAL NETWORK: HOW OFTEN DO YOU ATTEND CHURCH OR RELIGIOUS SERVICES?: NEVER

## 2025-03-05 SDOH — SOCIAL STABILITY: SOCIAL NETWORK: HOW OFTEN DO YOU GET TOGETHER WITH FRIENDS OR RELATIVES?: MORE THAN THREE TIMES A WEEK

## 2025-03-05 SDOH — SOCIAL STABILITY: SOCIAL INSECURITY: ARE YOU MARRIED, WIDOWED, DIVORCED, SEPARATED, NEVER MARRIED, OR LIVING WITH A PARTNER?: MARRIED

## 2025-03-05 SDOH — HEALTH STABILITY: MENTAL HEALTH
DO YOU FEEL STRESS - TENSE, RESTLESS, NERVOUS, OR ANXIOUS, OR UNABLE TO SLEEP AT NIGHT BECAUSE YOUR MIND IS TROUBLED ALL THE TIME - THESE DAYS?: TO SOME EXTENT

## 2025-03-05 SDOH — HEALTH STABILITY: PHYSICAL HEALTH: ON AVERAGE, HOW MANY DAYS PER WEEK DO YOU ENGAGE IN MODERATE TO STRENUOUS EXERCISE (LIKE A BRISK WALK)?: 0 DAYS

## 2025-03-05 SDOH — SOCIAL STABILITY: SOCIAL NETWORK: HOW OFTEN DO YOU ATTEND MEETINGS OF THE CLUBS OR ORGANIZATIONS YOU BELONG TO?: NEVER

## 2025-03-05 ASSESSMENT — PATIENT HEALTH QUESTIONNAIRE - PHQ9
SUM OF ALL RESPONSES TO PHQ QUESTIONS 1-9: 0
1. LITTLE INTEREST OR PLEASURE IN DOING THINGS: NOT AT ALL
SUM OF ALL RESPONSES TO PHQ QUESTIONS 1-9: 0
2. FEELING DOWN, DEPRESSED OR HOPELESS: NOT AT ALL
SUM OF ALL RESPONSES TO PHQ QUESTIONS 1-9: 0
SUM OF ALL RESPONSES TO PHQ QUESTIONS 1-9: 0

## 2025-03-05 NOTE — PROGRESS NOTES
Name and  confirmed w/ patient. An After Visit Summary was provided and all discharge instructions were reviewed with the patient including: f/up appt. [employer] texted to patients phone per his request. RN provided vision resources per MD. Time for questions and answers provided, patient verbalized understanding. Patient discharged from clinic in stable condition.   
\"Have you been to the ER, urgent care clinic since your last visit?  Hospitalized since your last visit?\"    NO    “Have you seen or consulted any other health care providers outside our system since your last visit?”    NO      “Have you had a colorectal cancer screening such as a colonoscopy/FIT/Cologuard?    NO      No colonoscopy on file  No cologuard on file  Date of last FIT: 7/21/2022   No flexible sigmoidoscopy on file            
no cambio la medicacion.    2) Leatha rinones esta normal.    3) Sow azucar esta un poquito alto.    4) Sow presion y vitamin D esta normal.        Return 7/2/25 at 1:40pm.       Lab follow up: 03/08/25  Component      Latest Ref Rng 3/4/2025   Thyrotropin Receptor Ab      0.00 - 1.75 IU/L 5.97 (H)       His TSH receptor ab is still high at 5.97 and only slightly improved from 6.53 in 10/24 so I will keep his methimazole at 10 mg 3x/week and won't have my nurse call him since we are not changing his dose.

## 2025-03-05 NOTE — PATIENT INSTRUCTIONS
1) Sigue tomando methimazole 10 mg 3 veces al semana.  La enfermera va a llamar si hay cambio ban no va a llamar si no cambio la medicacion.    2) Leatha rinones esta normal.    3) Sow azucar esta un poquito alto.    4) Sow presion y vitamin D esta normal.

## 2025-03-06 LAB — TSH RECEP AB SER-ACNC: 5.97 IU/L (ref 0–1.75)

## 2025-03-08 ENCOUNTER — RESULTS FOLLOW-UP (OUTPATIENT)
Facility: HOSPITAL | Age: 50
End: 2025-03-08

## 2025-06-03 ENCOUNTER — TELEPHONE (OUTPATIENT)
Age: 50
End: 2025-06-03

## 2025-06-03 NOTE — TELEPHONE ENCOUNTER
Attempted to call patient x2 to schedule him for lab appt before his visit with Dr. Samano on 07/02. Unable to leave a voicemail or get through at this time. [facility]  [phone] assisted.

## 2025-06-17 NOTE — TELEPHONE ENCOUNTER
Attempted to call patient and his spouse x2 to schedule a lab appt before 07/02. No answer at this time, voicemail left with the [facility] callback number.

## 2025-06-24 ENCOUNTER — HOSPITAL ENCOUNTER (OUTPATIENT)
Facility: HOSPITAL | Age: 50
Setting detail: SPECIMEN
Discharge: HOME | End: 2025-06-27

## 2025-06-24 ENCOUNTER — LAB (OUTPATIENT)
Age: 50
End: 2025-06-24

## 2025-06-24 DIAGNOSIS — R73.02 IMPAIRED GLUCOSE TOLERANCE (ORAL): ICD-10-CM

## 2025-06-24 DIAGNOSIS — I10 ESSENTIAL (PRIMARY) HYPERTENSION: ICD-10-CM

## 2025-06-24 DIAGNOSIS — E05.00 GRAVES DISEASE: ICD-10-CM

## 2025-06-24 DIAGNOSIS — E55.9 VITAMIN D DEFICIENCY, UNSPECIFIED: ICD-10-CM

## 2025-06-24 PROCEDURE — 80048 BASIC METABOLIC PNL TOTAL CA: CPT

## 2025-06-24 PROCEDURE — 83036 HEMOGLOBIN GLYCOSYLATED A1C: CPT

## 2025-06-24 PROCEDURE — 84439 ASSAY OF FREE THYROXINE: CPT

## 2025-06-24 PROCEDURE — 83520 IMMUNOASSAY QUANT NOS NONAB: CPT

## 2025-06-24 PROCEDURE — 84443 ASSAY THYROID STIM HORMONE: CPT

## 2025-06-25 LAB
ANION GAP SERPL CALC-SCNC: 4 MMOL/L (ref 2–12)
BUN SERPL-MCNC: 15 MG/DL (ref 6–20)
BUN/CREAT SERPL: 16 (ref 12–20)
CALCIUM SERPL-MCNC: 9.5 MG/DL (ref 8.5–10.1)
CHLORIDE SERPL-SCNC: 103 MMOL/L (ref 97–108)
CO2 SERPL-SCNC: 28 MMOL/L (ref 21–32)
CREAT SERPL-MCNC: 0.94 MG/DL (ref 0.7–1.3)
EST. AVERAGE GLUCOSE BLD GHB EST-MCNC: 111 MG/DL
GLUCOSE SERPL-MCNC: 125 MG/DL (ref 65–100)
HBA1C MFR BLD: 5.5 % (ref 4–5.6)
POTASSIUM SERPL-SCNC: 4.4 MMOL/L (ref 3.5–5.1)
SODIUM SERPL-SCNC: 135 MMOL/L (ref 136–145)
T4 FREE SERPL-MCNC: 1.1 NG/DL (ref 0.8–1.5)
TSH SERPL DL<=0.05 MIU/L-ACNC: 1.35 UIU/ML (ref 0.36–3.74)

## 2025-06-26 LAB — TSH RECEP AB SER-ACNC: 6.92 IU/L (ref 0–1.75)

## 2025-07-02 ENCOUNTER — RESULTS FOLLOW-UP (OUTPATIENT)
Age: 50
End: 2025-07-02

## 2025-07-02 ENCOUNTER — OFFICE VISIT (OUTPATIENT)
Age: 50
End: 2025-07-02

## 2025-07-02 VITALS
WEIGHT: 234 LBS | HEIGHT: 64 IN | TEMPERATURE: 97.5 F | DIASTOLIC BLOOD PRESSURE: 86 MMHG | HEART RATE: 60 BPM | SYSTOLIC BLOOD PRESSURE: 138 MMHG | BODY MASS INDEX: 39.95 KG/M2 | OXYGEN SATURATION: 99 %

## 2025-07-02 DIAGNOSIS — E05.00 GRAVES DISEASE: Primary | ICD-10-CM

## 2025-07-02 DIAGNOSIS — I10 ESSENTIAL (PRIMARY) HYPERTENSION: ICD-10-CM

## 2025-07-02 DIAGNOSIS — E55.9 VITAMIN D DEFICIENCY, UNSPECIFIED: ICD-10-CM

## 2025-07-02 DIAGNOSIS — R73.02 IMPAIRED GLUCOSE TOLERANCE (ORAL): ICD-10-CM

## 2025-07-02 PROCEDURE — 3075F SYST BP GE 130 - 139MM HG: CPT | Performed by: INTERNAL MEDICINE

## 2025-07-02 PROCEDURE — 99214 OFFICE O/P EST MOD 30 MIN: CPT | Performed by: INTERNAL MEDICINE

## 2025-07-02 PROCEDURE — 3079F DIAST BP 80-89 MM HG: CPT | Performed by: INTERNAL MEDICINE

## 2025-07-02 RX ORDER — ERGOCALCIFEROL 1.25 MG/1
50000 CAPSULE ORAL
Qty: 13 CAPSULE | Refills: 3 | Status: SHIPPED | OUTPATIENT
Start: 2025-07-02

## 2025-07-02 SDOH — ECONOMIC STABILITY: FOOD INSECURITY: WITHIN THE PAST 12 MONTHS, THE FOOD YOU BOUGHT JUST DIDN'T LAST AND YOU DIDN'T HAVE MONEY TO GET MORE.: NEVER TRUE

## 2025-07-02 SDOH — ECONOMIC STABILITY: FOOD INSECURITY: WITHIN THE PAST 12 MONTHS, YOU WORRIED THAT YOUR FOOD WOULD RUN OUT BEFORE YOU GOT MONEY TO BUY MORE.: NEVER TRUE

## 2025-07-02 NOTE — PROGRESS NOTES
Chief Complaint   Patient presents with    Follow-up    Graves' Disease     /86 (BP Site: Right Upper Arm, Patient Position: Sitting, BP Cuff Size: Large Adult)   Pulse 60   Temp 97.5 °F (36.4 °C) (Temporal)   Ht 1.63 m (5' 4.17\")   Wt 106.1 kg (234 lb)   SpO2 99%   BMI 39.95 kg/m²     Have you been to the ER, urgent care clinic since your last visit?  Hospitalized since your last visit?   NO    Have you seen or consulted any other health care providers outside our system since your last visit?   NO      “Have you had a colorectal cancer screening such as a colonoscopy/FIT/Cologuard?    NO    No colonoscopy on file  No cologuard on file  Date of last FIT: 7/21/2022   No flexible sigmoidoscopy on file    Austin Aguilera RN          
Name and  confirmed w/ patient. An After Visit Summary was provided and all discharge instructions were reviewed with the patient including: f/up appt and refills. Time for questions and answers provided, patient verbalized understanding. Patient discharged from clinic in stable condition.   
and  in 10/24.  A1c 5.6% and  in 3/25.  A1c 5.4% and  in 6/25  - cut back on carbs  - check A1c and cmp prior to next visit        Patient Instructions   1) Alejandra TSH esta normal.  Sigue tomando methimazole 3 pastillas al semana.    2) Leatha rinones esta normal.    3) Alejandra presion esta gerald.    4) Alejandra A1c (examen de 3 meses de azucar) esta normal ban alejandra azucar el pasha de leatha examenes sube porque comia watermelon.      Return 11/5/25 at 1:40pm.

## 2025-07-02 NOTE — PATIENT INSTRUCTIONS
1) Alejandra TSH esta normal.  Sigue tomando methimazole 3 pastillas al semana.    2) Leatha rinones esta normal.    3) Alejandra presion esta gerald.    4) Alejandra A1c (examen de 3 meses de azucar) esta normal ban alejandra azucar el pasha de leatha examenes sube porque comia watermelon.